# Patient Record
Sex: FEMALE | Race: WHITE | NOT HISPANIC OR LATINO | Employment: FULL TIME | URBAN - NONMETROPOLITAN AREA
[De-identification: names, ages, dates, MRNs, and addresses within clinical notes are randomized per-mention and may not be internally consistent; named-entity substitution may affect disease eponyms.]

---

## 2017-03-10 RX ORDER — AZELASTINE 1 MG/ML
1 SPRAY, METERED NASAL 2 TIMES DAILY
Qty: 1 EACH | Refills: 3 | Status: SHIPPED | OUTPATIENT
Start: 2017-03-10 | End: 2018-02-21 | Stop reason: SDUPTHER

## 2018-02-21 ENCOUNTER — OFFICE VISIT (OUTPATIENT)
Dept: INTERNAL MEDICINE | Facility: CLINIC | Age: 33
End: 2018-02-21

## 2018-02-21 VITALS
BODY MASS INDEX: 45.35 KG/M2 | TEMPERATURE: 98.1 F | HEIGHT: 60 IN | SYSTOLIC BLOOD PRESSURE: 120 MMHG | OXYGEN SATURATION: 97 % | WEIGHT: 231 LBS | DIASTOLIC BLOOD PRESSURE: 90 MMHG | HEART RATE: 86 BPM

## 2018-02-21 DIAGNOSIS — K52.9 GASTROENTERITIS: Primary | ICD-10-CM

## 2018-02-21 PROCEDURE — 99213 OFFICE O/P EST LOW 20 MIN: CPT | Performed by: PHYSICIAN ASSISTANT

## 2018-02-21 RX ORDER — PROMETHAZINE HYDROCHLORIDE 12.5 MG/1
12.5 TABLET ORAL EVERY 8 HOURS PRN
Qty: 12 TABLET | Refills: 0 | Status: SHIPPED | OUTPATIENT
Start: 2018-02-21 | End: 2021-06-26

## 2018-02-21 RX ORDER — AZELASTINE 1 MG/ML
1 SPRAY, METERED NASAL DAILY
Qty: 1 EACH | Refills: 3 | Status: SHIPPED | OUTPATIENT
Start: 2018-02-21 | End: 2018-05-23 | Stop reason: SDUPTHER

## 2018-02-21 NOTE — PROGRESS NOTES
"Subjective     Chief Complaint: diarrhea    History of Present Illness     Lorie Lara is a 32 y.o. female presenting with complaints of diarrhea, nausea, vomiting x 5-6 days.  Only 3 episodes of vomiting, but multiple watery bowel movements per day.  Diarrhea is watery, no blood or mucus visualized. Does not report any fevers. No recent travel. No recent antibiotic use.  She also notes increased acid reflux symptoms, she has been taking Tums which seemed to help.  She is staying well-hydrated.  Following BRAT diet.  Reports increased stress as she is in her last semester of grad school.    No recent labs, it has been 2-3 years.  She denies any prior GI issues.    The following portions of the patient's history were reviewed and updated as appropriate: current medications, allergies, PMH.    Review of Systems   Constitutional: Negative.    Respiratory: Negative.    Cardiovascular: Negative.    Gastrointestinal: Positive for diarrhea, nausea and vomiting. Negative for abdominal pain, blood in stool and constipation.   Genitourinary: Negative.    Neurological: Negative.        Objective     Vitals:    02/21/18 1130   BP: 120/90   Pulse: 86   Temp: 98.1 °F (36.7 °C)   SpO2: 97%   Weight: 105 kg (231 lb)   Height: 152.4 cm (60\")       Physical Exam   Constitutional: Appears well-developed and well-nourished.   Mouth/Throat: Oropharynx is clear and moist.   Neck: Normal range of motion. Neck supple.   Cardiovascular: Normal rate, regular rhythm and normal heart sounds.    Pulmonary/Chest: Effort normal and breath sounds normal.   Abdominal: Mildly tender to palpation in lower quadrants bilaterally.  Soft. Bowel sounds are normal x4. There is no CVA tenderness or epigastric tenderness.  Lowry sign negative.    Musculoskeletal: Normal range of motion.   Lymphadenopathy: No cervical adenopathy noted.   Neurological: Alert and oriented to person, place, and time.   Skin: Skin is warm and dry.   Psychiatric: Exhibits a " normal mood and affect.     Assessment/Plan     Diagnoses and all orders for this visit:    Gastroenteritis  -     promethazine (PHENERGAN) 12.5 MG tablet; Take 1 tablet by mouth Every 8 (Eight) Hours As Needed for Nausea or Vomiting.    Other orders  -     azelastine (ASTELIN) 0.1 % nasal spray; 1 spray into each nostril Daily.    Patient wishes for simple measures at this time.  Does not wish to try anything for GERD.  We discussed the importance of staying hydrated.  Promethazine given if needed.  If symptoms worsen or do not improve, RTC for labs, stool studies, further evaluation.  Patient agreeable to this plan.    Renetta Rivera PA-C  02/21/2018         Please note that portions of this note were completed with a voice recognition program. Efforts were made to edit dictation, but occasionally words are mistranscribed.

## 2018-05-23 RX ORDER — AZELASTINE 1 MG/ML
1 SPRAY, METERED NASAL DAILY
Qty: 1 EACH | Refills: 3 | Status: SHIPPED | OUTPATIENT
Start: 2018-05-23 | End: 2021-06-26

## 2020-06-29 ENCOUNTER — TRANSCRIBE ORDERS (OUTPATIENT)
Dept: LAB | Facility: HOSPITAL | Age: 35
End: 2020-06-29

## 2020-06-29 ENCOUNTER — LAB (OUTPATIENT)
Dept: LAB | Facility: HOSPITAL | Age: 35
End: 2020-06-29

## 2020-06-29 DIAGNOSIS — L08.0 PYODERMA: ICD-10-CM

## 2020-06-29 DIAGNOSIS — L08.0 PYODERMA: Primary | ICD-10-CM

## 2020-06-29 PROCEDURE — 87070 CULTURE OTHR SPECIMN AEROBIC: CPT

## 2020-06-29 PROCEDURE — 87205 SMEAR GRAM STAIN: CPT

## 2020-07-01 LAB
BACTERIA SPEC AEROBE CULT: NORMAL
GRAM STN SPEC: NORMAL
GRAM STN SPEC: NORMAL

## 2021-06-25 NOTE — PROGRESS NOTES
Date: 2021    Name: Lorie Lara  : 1985    Chief Complaint:   Chief Complaint   Patient presents with   • Establish Care     get lab order, discuss meds       HPI:  Lorie Lara is a 35 y.o. female presents to establish care.     A1C 5.8 in .  Had labs drawn at annual with GYN, Dr Martinez.  She has been eating fewer carbs since then.  Has lost 50 pounds.  Works as a  at [employer], sedentary job.  Has increased daily walking, increasing water intake, decreasing carb intake, abstaining from soda.  Takes Balance supplement - has folate, chromium, cecelia-inositol, setria L glutathione, diindolymethane, sodium R-alpha upoic acid daily.  Has plateaued with weight loss, has signed up for a [employer] challenge.  Is working with a dietitian, who helps her with macros and nutrient content in her food.    Hypothyroidism: TSH 1.17 in March.  Has been taking levothyroxine 100 mcg for 2 years, at the same dose. Denies abnormal fatigue, weight change, temperature intolerance, hair/skin/nail changes, bowel habit changes, palpitations, anxiety, sore throat, hoarseness.     Blood pressure is elevated today.  The only other time it has been elevated was while she was pregnant, immediately afterward. Denies chest pain, dyspnea, orthopnea, palpitations, lower extremity edema, confusion, headaches, weakness, visual disturbances.    Has pain in right heel.  Worse when standing in the morning, after sitting for long periods. 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  Pain has improved, not completely resolved.       History:  LMP: 22  Menarche: 13 years old  Sexual activity:   Last pap date: 3/2021   Abnormal pap? no  : 1  Para: 1, 2 year old daughter    Do you take any herbs or supplements that were not prescribed by a doctor? yes, Balance supplement described above.       Health Habits:  Dental Exam. up to date  Eye Exam. up  "to date, wears glasses  Exercise: 5 times/week.  Current exercise activities include: walking  Current diet: well balanced, low carb    History:    Past Medical History:   Diagnosis Date   • GERD (gastroesophageal reflux disease)    • Hypertension    • Upper respiratory infection        Past Surgical History:   Procedure Laterality Date   • INGUINAL HERNIA REPAIR     • TONSILLECTOMY         Family History   Problem Relation Age of Onset   • Hypertension Mother    • Thyroid disease Mother    • Arthritis Brother    • Cancer Other    • Melanoma Maternal Grandfather    • Obesity Paternal Grandmother        Social History     Socioeconomic History   • Marital status: Single     SO name: Wood Yanez   • Number of children: 1   • Highest education level: Master's in Public Health   Tobacco Use   • Smoking status: Former Smoker     Packs/day: 1.00     Years: 12.00     Pack years: 12.00     Types: Cigarettes     Quit date: 2013     Years since quittin.4   • Smokeless tobacco: Never Used   Substance and Sexual Activity   • Alcohol use: Yes     Comment: SOCIAL   • Drug use: No   • Sexual activity: Yes     Partners: Male     Birth control/protection: None       No Known Allergies      Current Outpatient Medications:   •  levothyroxine (SYNTHROID, LEVOTHROID) 100 MCG tablet, , Disp: , Rfl:   •  azelastine (ASTELIN) 0.1 % nasal spray, 2 sprays into the nostril(s) as directed by provider 2 (Two) Times a Day As Needed for Rhinitis. Use in each nostril as directed, Disp: 30 mL, Rfl: 11    VS:  Vitals:    21 1322   BP: 132/84   Pulse: 96   Temp: 97.3 °F (36.3 °C)   SpO2: 98%   Weight: 112 kg (247 lb)   Height: 152.4 cm (60\")     Body mass index is 48.24 kg/m².    PE:  Physical Exam  Constitutional:       Appearance: She is well-developed. She is morbidly obese. She is not ill-appearing.   HENT:      Head: Normocephalic.      Right Ear: Tympanic membrane, ear canal and external ear normal.      Left Ear: Tympanic " membrane, ear canal and external ear normal.      Nose: Nose normal.      Mouth/Throat:      Mouth: Mucous membranes are moist.      Pharynx: Oropharynx is clear. Uvula midline.   Eyes:      Extraocular Movements: Extraocular movements intact.      Conjunctiva/sclera: Conjunctivae normal.      Pupils: Pupils are equal, round, and reactive to light.   Neck:      Thyroid: No thyromegaly.      Vascular: No carotid bruit.   Cardiovascular:      Rate and Rhythm: Normal rate and regular rhythm.      Pulses: Normal pulses.      Heart sounds: Normal heart sounds.   Pulmonary:      Effort: Pulmonary effort is normal.      Breath sounds: Normal breath sounds.   Abdominal:      General: Bowel sounds are normal.      Palpations: Abdomen is soft.      Tenderness: There is no abdominal tenderness.   Musculoskeletal:         General: No tenderness or deformity.      Cervical back: Full passive range of motion without pain, normal range of motion and neck supple.      Right foot: Decreased range of motion (extension causes pain in heel).      Left foot: Normal.   Lymphadenopathy:      Cervical: No cervical adenopathy.   Skin:     General: Skin is warm.      Capillary Refill: Capillary refill takes less than 2 seconds.   Neurological:      Mental Status: She is alert and oriented to person, place, and time.      Sensory: No sensory deficit.      Coordination: Coordination normal.      Gait: Gait normal.      Comments: CN II-XII normal   Psychiatric:         Attention and Perception: Attention normal.         Mood and Affect: Mood and affect normal.         Speech: Speech normal.         Behavior: Behavior normal.         Thought Content: Thought content normal.       Office Visit on 06/26/2021   Component Date Value Ref Range Status   • Hemoglobin A1C 06/26/2021 5.3  % Final        Assessment/Plan:     Diagnoses and all orders for this visit:    1. Encounter to establish care (Primary)    2. Environmental and seasonal allergies  -      azelastine (ASTELIN) 0.1 % nasal spray; 2 sprays into the nostril(s) as directed by provider 2 (Two) Times a Day As Needed for Rhinitis. Use in each nostril as directed  Dispense: 30 mL; Refill: 11        - Avoid known allergy triggers when possible, wash hands and face after exposure.  Consider changing clothes.        - HEPA filter in vacuum and HVAC        - Change/launder bed linens at least once a week        - Vacuum carpeted areas in the home, 2-3 times a week    3. Screening for disorder of blood and blood-forming organs  -     CBC (No Diff)    4. Screening for endocrine, metabolic and immunity disorder  -     Comprehensive Metabolic Panel    5. Vitamin D deficiency  -     Vitamin D 25 Hydroxy    6. Encounter for hepatitis C screening test for low risk patient  -     Hepatitis C Antibody    7. Hyperglycemia  -     POC Glycosylated Hemoglobin (Hb A1C)  - Continue increasing physical activity, eating healthy lowcarb diet    8. Elevated blood pressure reading        - Follow heart healthy diet.  Advised to reduce daily sodium intake to < 1500 mg per day.  Avoid processed & fast foods.        - Exercise as tolerated, with a goal of 30 minutes of moderate exercise most days.       9. Encounter for annual physical examination excluding gynecological examination in a patient older than 17 years    10. Plantar fasciitis of right foot         - Continue stretches of foot.         - May consider night splint HS         - Weight loss should reduce pain      11. Morbid obesity with body mass index (BMI) of 45.0 to 49.9 in adult (CMS/[facility])         - Patient's (Body mass index is 48.24 kg/m².) indicates that they are morbidly obese (BMI > 40 or > 35 with obesity - related health condition) with obesity-related health conditions that include GERD . Obesity is improving with lifestyle modifications. BMI is is above average; BMI management plan is completed. We discussed low calorie, low carb based diet program, portion  control and increasing exercise.         Return in about 4 weeks (around 7/24/2021) for Next scheduled follow up.

## 2021-06-26 ENCOUNTER — OFFICE VISIT (OUTPATIENT)
Dept: INTERNAL MEDICINE | Facility: CLINIC | Age: 36
End: 2021-06-26

## 2021-06-26 VITALS
TEMPERATURE: 97.3 F | DIASTOLIC BLOOD PRESSURE: 84 MMHG | WEIGHT: 247 LBS | SYSTOLIC BLOOD PRESSURE: 132 MMHG | HEART RATE: 96 BPM | HEIGHT: 60 IN | OXYGEN SATURATION: 98 % | BODY MASS INDEX: 48.49 KG/M2

## 2021-06-26 DIAGNOSIS — Z11.59 ENCOUNTER FOR HEPATITIS C SCREENING TEST FOR LOW RISK PATIENT: ICD-10-CM

## 2021-06-26 DIAGNOSIS — Z13.228 SCREENING FOR ENDOCRINE, METABOLIC AND IMMUNITY DISORDER: ICD-10-CM

## 2021-06-26 DIAGNOSIS — E66.01 MORBID OBESITY WITH BODY MASS INDEX (BMI) OF 45.0 TO 49.9 IN ADULT (HCC): ICD-10-CM

## 2021-06-26 DIAGNOSIS — Z13.0 SCREENING FOR ENDOCRINE, METABOLIC AND IMMUNITY DISORDER: ICD-10-CM

## 2021-06-26 DIAGNOSIS — Z13.29 SCREENING FOR ENDOCRINE, METABOLIC AND IMMUNITY DISORDER: ICD-10-CM

## 2021-06-26 DIAGNOSIS — J30.89 ENVIRONMENTAL AND SEASONAL ALLERGIES: ICD-10-CM

## 2021-06-26 DIAGNOSIS — E55.9 VITAMIN D DEFICIENCY: ICD-10-CM

## 2021-06-26 DIAGNOSIS — R03.0 ELEVATED BLOOD PRESSURE READING: ICD-10-CM

## 2021-06-26 DIAGNOSIS — R73.9 HYPERGLYCEMIA: ICD-10-CM

## 2021-06-26 DIAGNOSIS — Z00.00 ENCOUNTER FOR ANNUAL PHYSICAL EXAMINATION EXCLUDING GYNECOLOGICAL EXAMINATION IN A PATIENT OLDER THAN 17 YEARS: ICD-10-CM

## 2021-06-26 DIAGNOSIS — Z76.89 ENCOUNTER TO ESTABLISH CARE: Primary | ICD-10-CM

## 2021-06-26 DIAGNOSIS — M72.2 PLANTAR FASCIITIS OF RIGHT FOOT: ICD-10-CM

## 2021-06-26 DIAGNOSIS — Z13.0 SCREENING FOR DISORDER OF BLOOD AND BLOOD-FORMING ORGANS: ICD-10-CM

## 2021-06-26 PROBLEM — J06.9 UPPER RESPIRATORY TRACT INFECTION: Status: ACTIVE | Noted: 2021-06-26

## 2021-06-26 PROBLEM — E07.9 DISORDER OF THYROID: Status: ACTIVE | Noted: 2021-06-26

## 2021-06-26 PROBLEM — B37.2 CANDIDIASIS OF SKIN: Status: ACTIVE | Noted: 2021-06-26

## 2021-06-26 LAB — HBA1C MFR BLD: 5.3 %

## 2021-06-26 PROCEDURE — 99395 PREV VISIT EST AGE 18-39: CPT | Performed by: NURSE PRACTITIONER

## 2021-06-26 RX ORDER — LEVOTHYROXINE SODIUM 0.1 MG/1
TABLET ORAL
COMMUNITY
Start: 2021-06-15 | End: 2021-09-20 | Stop reason: SDUPTHER

## 2021-06-26 RX ORDER — AZELASTINE 1 MG/ML
2 SPRAY, METERED NASAL 2 TIMES DAILY PRN
Qty: 30 ML | Refills: 11 | Status: SHIPPED | OUTPATIENT
Start: 2021-06-26 | End: 2021-09-20 | Stop reason: SDUPTHER

## 2021-09-20 DIAGNOSIS — J30.89 ENVIRONMENTAL AND SEASONAL ALLERGIES: ICD-10-CM

## 2021-09-20 RX ORDER — LEVOTHYROXINE SODIUM 0.1 MG/1
100 TABLET ORAL DAILY
Qty: 30 TABLET | Refills: 0 | Status: SHIPPED | OUTPATIENT
Start: 2021-09-20 | End: 2021-10-15 | Stop reason: SDUPTHER

## 2021-09-20 RX ORDER — AZELASTINE 1 MG/ML
2 SPRAY, METERED NASAL 2 TIMES DAILY PRN
Qty: 30 ML | Refills: 11 | Status: ON HOLD | OUTPATIENT
Start: 2021-09-20 | End: 2022-06-17

## 2021-09-20 NOTE — TELEPHONE ENCOUNTER
Caller: Lorie Lara    Relationship: Self    Best call back number: [phone]    Medication needed:   Requested Prescriptions     Pending Prescriptions Disp Refills   • azelastine (ASTELIN) 0.1 % nasal spray 30 mL 11     Si sprays into the nostril(s) as directed by provider 2 (Two) Times a Day As Needed for Rhinitis. Use in each nostril as directed   • levothyroxine (SYNTHROID, LEVOTHROID) 100 MCG tablet         When do you need the refill by:ASAP    What additional details did the patient provide when requesting the medication: COMPLETELY OUT    Does the patient have less than a 3 day supply:  [x] Yes  [] No    What is the patient's preferred pharmacy: [facility] [address] [place] [address] [facility] [phone] [facility] [phone] FX

## 2021-09-22 LAB
25(OH)D3+25(OH)D2 SERPL-MCNC: 55.9 NG/ML (ref 30–100)
ALBUMIN SERPL-MCNC: 4.6 G/DL (ref 3.8–4.8)
ALBUMIN/GLOB SERPL: 2 {RATIO} (ref 1.2–2.2)
ALP SERPL-CCNC: 78 IU/L (ref 44–121)
ALT SERPL-CCNC: 14 IU/L (ref 0–32)
AST SERPL-CCNC: 15 IU/L (ref 0–40)
BILIRUB SERPL-MCNC: <0.2 MG/DL (ref 0–1.2)
BUN SERPL-MCNC: 15 MG/DL (ref 6–20)
BUN/CREAT SERPL: 22 (ref 9–23)
CALCIUM SERPL-MCNC: 9.5 MG/DL (ref 8.7–10.2)
CHLORIDE SERPL-SCNC: 107 MMOL/L (ref 96–106)
CO2 SERPL-SCNC: 21 MMOL/L (ref 20–29)
CREAT SERPL-MCNC: 0.69 MG/DL (ref 0.57–1)
ERYTHROCYTE [DISTWIDTH] IN BLOOD BY AUTOMATED COUNT: 12.6 % (ref 11.7–15.4)
GLOBULIN SER CALC-MCNC: 2.3 G/DL (ref 1.5–4.5)
GLUCOSE SERPL-MCNC: 102 MG/DL (ref 65–99)
HCT VFR BLD AUTO: 40.7 % (ref 34–46.6)
HCV AB S/CO SERPL IA: <0.1 S/CO RATIO (ref 0–0.9)
HGB BLD-MCNC: 13.7 G/DL (ref 11.1–15.9)
MCH RBC QN AUTO: 30.1 PG (ref 26.6–33)
MCHC RBC AUTO-ENTMCNC: 33.7 G/DL (ref 31.5–35.7)
MCV RBC AUTO: 90 FL (ref 79–97)
PLATELET # BLD AUTO: 235 X10E3/UL (ref 150–450)
POTASSIUM SERPL-SCNC: 4 MMOL/L (ref 3.5–5.2)
PROT SERPL-MCNC: 6.9 G/DL (ref 6–8.5)
RBC # BLD AUTO: 4.55 X10E6/UL (ref 3.77–5.28)
SODIUM SERPL-SCNC: 140 MMOL/L (ref 134–144)
WBC # BLD AUTO: 7.5 X10E3/UL (ref 3.4–10.8)

## 2021-09-27 ENCOUNTER — TRANSCRIBE ORDERS (OUTPATIENT)
Dept: LAB | Facility: HOSPITAL | Age: 36
End: 2021-09-27

## 2021-09-27 ENCOUNTER — LAB (OUTPATIENT)
Dept: LAB | Facility: HOSPITAL | Age: 36
End: 2021-09-27

## 2021-09-27 DIAGNOSIS — Z20.822 COVID-19 RULED OUT: Primary | ICD-10-CM

## 2021-09-27 DIAGNOSIS — Z20.822 COVID-19 RULED OUT: ICD-10-CM

## 2021-09-27 LAB — SARS-COV-2 RNA NOSE QL NAA+PROBE: NOT DETECTED

## 2021-09-27 PROCEDURE — U0004 COV-19 TEST NON-CDC HGH THRU: HCPCS

## 2021-09-27 PROCEDURE — C9803 HOPD COVID-19 SPEC COLLECT: HCPCS

## 2021-10-15 RX ORDER — LEVOTHYROXINE SODIUM 0.1 MG/1
100 TABLET ORAL DAILY
Qty: 30 TABLET | Refills: 0 | Status: SHIPPED | OUTPATIENT
Start: 2021-10-15 | End: 2021-11-14 | Stop reason: SDUPTHER

## 2021-11-14 DIAGNOSIS — E07.9 THYROID DISORDER: Primary | ICD-10-CM

## 2021-11-15 RX ORDER — LEVOTHYROXINE SODIUM 0.1 MG/1
100 TABLET ORAL DAILY
Qty: 30 TABLET | Refills: 0 | Status: SHIPPED | OUTPATIENT
Start: 2021-11-15 | End: 2021-12-15 | Stop reason: SDUPTHER

## 2021-11-15 NOTE — TELEPHONE ENCOUNTER
Patient is requesting a 90 day supply, I do not see that labs was drawn. Per patient she states she had them drawn in Sept

## 2021-11-15 NOTE — TELEPHONE ENCOUNTER
Refill for one month sent.  Order placed for TSH, to be drawn before another refill sent.      If she's had labs drawn elsewhere, can send copies.

## 2021-11-29 ENCOUNTER — TELEMEDICINE (OUTPATIENT)
Dept: FAMILY MEDICINE CLINIC | Facility: TELEHEALTH | Age: 36
End: 2021-11-29

## 2021-11-29 DIAGNOSIS — J06.9 VIRAL UPPER RESPIRATORY TRACT INFECTION: Primary | ICD-10-CM

## 2021-11-29 PROCEDURE — 99213 OFFICE O/P EST LOW 20 MIN: CPT | Performed by: NURSE PRACTITIONER

## 2021-11-29 RX ORDER — ONDANSETRON 4 MG/1
4 TABLET, ORALLY DISINTEGRATING ORAL EVERY 8 HOURS PRN
Qty: 10 TABLET | Refills: 0 | Status: SHIPPED | OUTPATIENT
Start: 2021-11-29 | End: 2022-02-07

## 2021-11-29 RX ORDER — PSEUDOEPHEDRINE HCL 120 MG/1
120 TABLET, FILM COATED, EXTENDED RELEASE ORAL EVERY 12 HOURS
Qty: 30 TABLET | Refills: 0 | Status: SHIPPED | OUTPATIENT
Start: 2021-11-29 | End: 2021-12-14

## 2021-11-29 NOTE — PATIENT INSTRUCTIONS
You may feel you have not been exposed to COVID-19, but you are having mild symptoms. Many of the symptoms you get around allergy, cold and flu season may be mistaken for allergy or sinus symptoms, but for some These are the only symptoms they have with their COVID-19 illness.   Testing will be the only way to know.   Symptoms may worsen over the next several days.   I have included a COVID-19 test. Go to your nearest [facility] for testing. Tell them you have already been seen virtually and only need testing   We will notify you of your COVID-19 results by phone. You will receive a call from an unknown or blocked number. You can also get your results on your [employer] [facility].  You will need to remain quarantined for 10 days from onset of symptoms and only to discontinue if symptoms have improved and no fever for 24 hours without the use of fever reducing medications such as Tylenol (acetaminophen), Advil (ibuprfen), or Aleve (naproxen).  Continue to wear a mask for 14 days or until symptoms resolve. If symptoms worsen, go to Urgent Care or Emergency Department for care.   Symptoms of Coronavirus are treated just as you would for any viral illness. Take Tylenol and/or Ibuprofen for pain and/or fever, you may find it better to alternate these every 4 hours, so that you are only taking each every 8 hours. Stay hydrated, rest and you may treat cough with over-the-counter cough syrup such as Robitussin.  If your symptoms do not improve, symptoms change or do not fully resolve, seek further evaluation with your primary care provider or Urgent Care. If you develop severe symptoms, such as chest pain, high fever, difficulty breathing, difficulty urinating or have any symptoms that interfere with your ability to function go to the nearest Emergency Department immediately.       3 Key Steps to Take While Waiting for Your COVID-19 Test Result  To help stop the spread of COVID-19, take these 3 key steps NOW while  waiting for your test results:  1. Stay home and monitor your health.  Stay home and monitor your health to help protect your friends, family, and others from possibly getting COVID-19 from you.  Stay home and away from others:  · If possible, stay away from others, especially people who are at higher risk for getting very sick from COVID-19, such as older adults and people with other medical conditions.  · If you have been in contact with someone with COVID-19, stay home and away from others for 14 days after your last contact with that person. Follow the recommendations of your local public health department if you need to quarantine.  · If you have a fever, cough or other symptoms of COVID-19, stay home and away from others (except to get medical care).  Monitor your health:  · Watch for fever, cough, shortness of breath, or other symptoms of COVID-19. Remember, symptoms may appear 2-14 days after exposure to COVID-19 and can include:  ? Fever or chills  ? Cough  ? Shortness of breath or difficulty breathing  ? Tiredness  ? Muscle or body aches  ? Headache  ? New loss of taste or smell  ? Sore throat  ? Congestion or runny nose  ? Nausea or vomiting  ? Diarrhea  2. Think about the people you have recently been around.  If you are diagnosed with COVID-19, a public health worker may call you to check on your health, discuss who you have been around, and ask where you spent time while you may have been able to spread COVID-19 to others. While you wait for your COVID-19 test result, think about everyone you have been around recently. This will be important information to give health workers if your test is positive.   Complete the information on the back of this page to help you remember everyone you have been around.   3. Answer the phone call from the health department.  If a public health worker calls you, answer the call to help slow the spread of COVID-19 in your community.  · Discussions with health department  staff are confidential. This means that your personal and medical information will be kept private and only shared with those who may need to know, like your health care provider.  · Your name will not be shared with those you came in contact with. The health department will only notify people you were in close contact with (within 6 feet for more than 15 minutes) that they might have been exposed to COVID-19.  Think about the people you have recently been around  If you test positive and are diagnosed with COVID-19, someone from the health department may call to check-in on your health, discuss who you have been around, and ask where you spent time while you may have been able to spread COVID-19 to others. This form can help you think about people you have recently been around so you will be ready if a public health worker calls you.  Things to think about. Have you:  · Gone to work or school?  · Gotten together with others (eaten out at a restaurant, gone out for drinks, exercised with others or gone to a gym, had friends or family over to your house, volunteered, gone to a party, pool, or park)?  · Gone to a store in person (e.g., grocery store, mall)?  · Gone to in-person appointments (e.g., salon, goff, doctor's or dentist's office)?  · Ridden in a car with others (e.g., rideshare) or taken public transportation?  · Been inside a Confucianism, Methodist, Anabaptist or other places of Anabaptism?  Who lives with you?  · ______________________________________________________________________  · ______________________________________________________________________  · ______________________________________________________________________  · ______________________________________________________________________  Who have you been around (less than 6 feet for a total of 15 minutes or more) in the last 10 days? (You may have more people to list than the space provided. If so, write on the front of this sheet or a separate piece  of paper.)  Name ______________________________________________  · Phone number ____________________________________  · Date you last saw them _____________________________  · Where you last saw them ________________________________________________  Name ______________________________________________  · Phone number ____________________________________  · Date you last saw them _____________________________  · Where you last saw them ________________________________________________  Name ______________________________________________  · Phone number ____________________________________  · Date you last saw them _____________________________  · Where you last saw them ________________________________________________  Name ______________________________________________  · Phone number ____________________________________  · Date you last saw them _____________________________  · Where you last saw them ________________________________________________  Name ______________________________________________  · Phone number ____________________________________  · Date you last saw them _____________________________  · Where you last saw them ________________________________________________  What have you done in the last 10 days with other people?  Activity _____________________________________________  · Location _________________________________________  · Date ____________________________________________  Activity _____________________________________________  · Location _________________________________________  · Date ____________________________________________  Activity _____________________________________________  · Location _________________________________________  · Date ____________________________________________  Activity _____________________________________________  · Location _________________________________________  · Date ____________________________________________  Activity  _____________________________________________  · Location _________________________________________  · Date ____________________________________________  Centers for Disease Control and Prevention  cdc.gov/coronavirus  07/09/2021  This information is not intended to replace advice given to you by your health care provider. Make sure you discuss any questions you have with your health care provider.  Document Revised: 07/14/2021 Document Reviewed: 07/14/2021  Elsevier Patient Education © 2021 [employer] Inc[employer]

## 2021-11-29 NOTE — PROGRESS NOTES
You have chosen to receive care through a telehealth visit.  Do you consent to use a video/audio connection for your medical care today? Yes     CHIEF COMPLAINT  Chief Complaint   Patient presents with   • Sinusitis         HPI  Lorie Lara is a 36 y.o. female  presents with complaint of sinusitis symptoms for several days. She has been taking her regular allergy medication.     Review of Systems   Constitutional: Positive for fatigue. Negative for chills, diaphoresis and fever.   HENT: Positive for congestion, postnasal drip, rhinorrhea, sinus pressure, sneezing and sore throat. Negative for sinus pain.         Loss of taste    Respiratory: Positive for cough. Negative for shortness of breath and wheezing.    Cardiovascular: Negative for chest pain.   Gastrointestinal: Positive for nausea. Negative for diarrhea and vomiting.   Skin: Negative for rash.   Neurological: Positive for light-headedness (x 1).   Hematological: Negative for adenopathy.       Past Medical History:   Diagnosis Date   • GERD (gastroesophageal reflux disease)    • Hypertension    • Upper respiratory infection        Family History   Problem Relation Age of Onset   • Hypertension Mother    • Thyroid disease Mother    • Arthritis Brother    • Cancer Other    • Melanoma Maternal Grandfather    • Obesity Paternal Grandmother        Social History     Socioeconomic History   • Marital status: Single   Tobacco Use   • Smoking status: Former Smoker     Packs/day: 1.00     Years: 12.00     Pack years: 12.00     Types: Cigarettes     Quit date: 2013     Years since quittin.9   • Smokeless tobacco: Never Used   Substance and Sexual Activity   • Alcohol use: Yes     Comment: SOCIAL   • Drug use: No   • Sexual activity: Yes     Partners: Male     Birth control/protection: None         There were no vitals taken for this visit.    PHYSICAL EXAM  Virtual Visit Physical Exam    Results for orders placed or performed in visit on 21    COVID-19 PCR, [facility], NP SWAB IN [facility] VIRAL TRANSPORT MEDIA/ORAL SWISH 24-30 HR TAT - Swab, Nasopharynx    Specimen: Nasopharynx; Swab   Result Value Ref Range    SARS-CoV-2 JADA Not Detected Not Detected       Diagnoses and all orders for this visit:    1. Viral upper respiratory tract infection (Primary)    Other orders  -     pseudoephedrine (Sudafed 12 Hour) 120 MG 12 hr tablet; Take 1 tablet by mouth Every 12 (Twelve) Hours for 15 days.  Dispense: 30 tablet; Refill: 0  -     ondansetron ODT (Zofran ODT) 4 MG disintegrating tablet; Place 1 tablet on the tongue Every 8 (Eight) Hours As Needed for Nausea or Vomiting.  Dispense: 10 tablet; Refill: 0      COVID-19 (PFIZER) 1/20/2021, 12/30/2020   Recommend COVID-19 testing. She works at [employer] and will have testing done there tomorrow at their drive through.       FOLLOW-UP  As discussed during visit with PCP/[facility] if no improvement or Urgent Care/Emergency Department if worsening of symptoms    Patient verbalizes understanding of medication dosage, comfort measures, instructions for treatment and follow-up.    Belgica Medeiros, APRN  11/29/2021  16:40 EST    This visit was performed via Telehealth.  This patient has been instructed to follow-up with their primary care provider if their symptoms worsen or the treatment provided does not resolve their illness.

## 2021-11-30 ENCOUNTER — CLINICAL SUPPORT (OUTPATIENT)
Dept: INTERNAL MEDICINE | Facility: CLINIC | Age: 36
End: 2021-11-30

## 2021-11-30 ENCOUNTER — TELEPHONE (OUTPATIENT)
Dept: INTERNAL MEDICINE | Facility: CLINIC | Age: 36
End: 2021-11-30

## 2021-11-30 DIAGNOSIS — R53.83 FATIGUE, UNSPECIFIED TYPE: ICD-10-CM

## 2021-11-30 DIAGNOSIS — R09.81 NASAL CONGESTION: ICD-10-CM

## 2021-11-30 DIAGNOSIS — R05.9 COUGH: ICD-10-CM

## 2021-11-30 DIAGNOSIS — R53.83 FATIGUE, UNSPECIFIED TYPE: Primary | ICD-10-CM

## 2021-11-30 PROCEDURE — U0004 COV-19 TEST NON-CDC HGH THRU: HCPCS | Performed by: NURSE PRACTITIONER

## 2021-12-01 LAB — SARS-COV-2 RNA NOSE QL NAA+PROBE: NOT DETECTED

## 2021-12-02 ENCOUNTER — TELEMEDICINE (OUTPATIENT)
Dept: FAMILY MEDICINE CLINIC | Facility: TELEHEALTH | Age: 36
End: 2021-12-02

## 2021-12-02 DIAGNOSIS — J01.00 ACUTE MAXILLARY SINUSITIS, RECURRENCE NOT SPECIFIED: Primary | ICD-10-CM

## 2021-12-02 PROCEDURE — 99213 OFFICE O/P EST LOW 20 MIN: CPT | Performed by: NURSE PRACTITIONER

## 2021-12-02 RX ORDER — BROMPHENIRAMINE MALEATE, PSEUDOEPHEDRINE HYDROCHLORIDE, AND DEXTROMETHORPHAN HYDROBROMIDE 2; 30; 10 MG/5ML; MG/5ML; MG/5ML
10 SYRUP ORAL 4 TIMES DAILY PRN
Qty: 200 ML | Refills: 0 | Status: SHIPPED | OUTPATIENT
Start: 2021-12-02 | End: 2022-01-10

## 2021-12-02 RX ORDER — AMOXICILLIN AND CLAVULANATE POTASSIUM 875; 125 MG/1; MG/1
1 TABLET, FILM COATED ORAL 2 TIMES DAILY
Qty: 20 TABLET | Refills: 0 | Status: SHIPPED | OUTPATIENT
Start: 2021-12-02 | End: 2021-12-12

## 2021-12-02 NOTE — PATIENT INSTRUCTIONS
Sinusitis, Adult  Sinusitis is inflammation of your sinuses. Sinuses are hollow spaces in the bones around your face. Your sinuses are located:  · Around your eyes.  · In the middle of your forehead.  · Behind your nose.  · In your cheekbones.  Mucus normally drains out of your sinuses. When your nasal tissues become inflamed or swollen, mucus can become trapped or blocked. This allows bacteria, viruses, and fungi to grow, which leads to infection. Most infections of the sinuses are caused by a virus.  Sinusitis can develop quickly. It can last for up to 4 weeks (acute) or for more than 12 weeks (chronic). Sinusitis often develops after a cold.  What are the causes?  This condition is caused by anything that creates swelling in the sinuses or stops mucus from draining. This includes:  · Allergies.  · Asthma.  · Infection from bacteria or viruses.  · Deformities or blockages in your nose or sinuses.  · Abnormal growths in the nose (nasal polyps).  · Pollutants, such as chemicals or irritants in the air.  · Infection from fungi (rare).  What increases the risk?  You are more likely to develop this condition if you:  · Have a weak body defense system (immune system).  · Do a lot of swimming or diving.  · Overuse nasal sprays.  · Smoke.  What are the signs or symptoms?  The main symptoms of this condition are pain and a feeling of pressure around the affected sinuses. Other symptoms include:  · Stuffy nose or congestion.  · Thick drainage from your nose.  · Swelling and warmth over the affected sinuses.  · Headache.  · Upper toothache.  · A cough that may get worse at night.  · Extra mucus that collects in the throat or the back of the nose (postnasal drip).  · Decreased sense of smell and taste.  · Fatigue.  · A fever.  · Sore throat.  · Bad breath.  How is this diagnosed?  This condition is diagnosed based on:  · Your symptoms.  · Your medical history.  · A physical exam.  · Tests to find out if your condition is  acute or chronic. This may include:  ? Checking your nose for nasal polyps.  ? Viewing your sinuses using a device that has a light (endoscope).  ? Testing for allergies or bacteria.  ? Imaging tests, such as an MRI or CT scan.  In rare cases, a bone biopsy may be done to rule out more serious types of fungal sinus disease.  How is this treated?  Treatment for sinusitis depends on the cause and whether your condition is chronic or acute.  · If caused by a virus, your symptoms should go away on their own within 10 days. You may be given medicines to relieve symptoms. They include:  ? Medicines that shrink swollen nasal passages (topical intranasal decongestants).  ? Medicines that treat allergies (antihistamines).  ? A spray that eases inflammation of the nostrils (topical intranasal corticosteroids).  ? Rinses that help get rid of thick mucus in your nose (nasal saline washes).  · If caused by bacteria, your health care provider may recommend waiting to see if your symptoms improve. Most bacterial infections will get better without antibiotic medicine. You may be given antibiotics if you have:  ? A severe infection.  ? A weak immune system.  · If caused by narrow nasal passages or nasal polyps, you may need to have surgery.  Follow these instructions at home:  Medicines  · Take, use, or apply over-the-counter and prescription medicines only as told by your health care provider. These may include nasal sprays.  · If you were prescribed an antibiotic medicine, take it as told by your health care provider. Do not stop taking the antibiotic even if you start to feel better.  Hydrate and humidify    · Drink enough fluid to keep your urine pale yellow. Staying hydrated will help to thin your mucus.  · Use a cool mist humidifier to keep the humidity level in your home above 50%.  · Inhale steam for 10-15 minutes, 3-4 times a day, or as told by your health care provider. You can do this in the bathroom while a hot shower is  running.  · Limit your exposure to cool or dry air.    Rest  · Rest as much as possible.  · Sleep with your head raised (elevated).  · Make sure you get enough sleep each night.  General instructions    · Apply a warm, moist washcloth to your face 3-4 times a day or as told by your health care provider. This will help with discomfort.  · Wash your hands often with soap and water to reduce your exposure to germs. If soap and water are not available, use hand .  · Do not smoke. Avoid being around people who are smoking (secondhand smoke).  · Keep all follow-up visits as told by your health care provider. This is important.    Contact a health care provider if:  · You have a fever.  · Your symptoms get worse.  · Your symptoms do not improve within 10 days.  Get help right away if:  · You have a severe headache.  · You have persistent vomiting.  · You have severe pain or swelling around your face or eyes.  · You have vision problems.  · You develop confusion.  · Your neck is stiff.  · You have trouble breathing.  Summary  · Sinusitis is soreness and inflammation of your sinuses. Sinuses are hollow spaces in the bones around your face.  · This condition is caused by nasal tissues that become inflamed or swollen. The swelling traps or blocks the flow of mucus. This allows bacteria, viruses, and fungi to grow, which leads to infection.  · If you were prescribed an antibiotic medicine, take it as told by your health care provider. Do not stop taking the antibiotic even if you start to feel better.  · Keep all follow-up visits as told by your health care provider. This is important.  This information is not intended to replace advice given to you by your health care provider. Make sure you discuss any questions you have with your health care provider.  Document Revised: 05/20/2019 Document Reviewed: 05/20/2019  [employer] Patient Education © 2021 [employer]

## 2021-12-02 NOTE — PROGRESS NOTES
You have chosen to receive care through a telehealth visit.  Do you consent to use a video/audio connection for your medical care today? Yes     CHIEF COMPLAINT  No chief complaint on file.        HPI  Lorie Lara is a 36 y.o. female  presents with complaint of nasal congestion with green discharge, facial pressure, ear pain, sore throat, PND, cough with green and occasional brown sputum .  Denies fever, shortness of breath or wheezing.     COVID-19 result is negative    Review of Systems   Constitutional: Negative for fatigue and fever.   HENT: Positive for congestion, ear pain, postnasal drip, sinus pressure, sinus pain and sore throat.    Respiratory: Positive for cough. Negative for chest tightness, shortness of breath and wheezing.    All other systems reviewed and are negative.      Past Medical History:   Diagnosis Date   • GERD (gastroesophageal reflux disease)    • Hypertension    • Upper respiratory infection        Family History   Problem Relation Age of Onset   • Hypertension Mother    • Thyroid disease Mother    • Arthritis Brother    • Cancer Other    • Melanoma Maternal Grandfather    • Obesity Paternal Grandmother        Social History     Socioeconomic History   • Marital status: Single   Tobacco Use   • Smoking status: Former Smoker     Packs/day: 1.00     Years: 12.00     Pack years: 12.00     Types: Cigarettes     Quit date: 2013     Years since quittin.9   • Smokeless tobacco: Never Used   Substance and Sexual Activity   • Alcohol use: Yes     Comment: SOCIAL   • Drug use: No   • Sexual activity: Yes     Partners: Male     Birth control/protection: None         There were no vitals taken for this visit.    PHYSICAL EXAM  Physical Exam   Constitutional: She is oriented to person, place, and time. She appears well-developed and well-nourished. She does not have a sickly appearance. She does not appear ill.   HENT:   Head: Normocephalic and atraumatic.   Bilateral maxillary sinus  tenderness   Pulmonary/Chest: Effort normal.  No respiratory distress.  Neurological: She is alert and oriented to person, place, and time.       Results for orders placed or performed in visit on 11/30/21   COVID-19 PCR, [facility], NP SWAB IN [facility] VIRAL TRANSPORT MEDIA/ORAL SWISH 24-30 HR TAT - Swab, Nasopharynx    Specimen: Nasopharynx; Swab   Result Value Ref Range    SARS-CoV-2 JADA Not Detected Not Detected       Diagnoses and all orders for this visit:    1. Acute maxillary sinusitis, recurrence not specified (Primary)  -     amoxicillin-clavulanate (AUGMENTIN) 875-125 MG per tablet; Take 1 tablet by mouth 2 (Two) Times a Day for 10 days.  Dispense: 20 tablet; Refill: 0  -     brompheniramine-pseudoephedrine-DM 30-2-10 MG/5ML syrup; Take 10 mL by mouth 4 (Four) Times a Day As Needed for Congestion, Cough or Allergies.  Dispense: 200 mL; Refill: 0    --take medications as prescribed  --increase fluids; tylenol or ibuprofen for pain  --if no improvement in 5-7 days, will need follow-up for further evaluation      FOLLOW-UP  As discussed during visit with PCP/[facility] if no improvement or Urgent Care/Emergency Department if worsening of symptoms    Patient verbalizes understanding of medication dosage, comfort measures, instructions for treatment and follow-up.    TITO Bauer  12/02/2021  07:53 EST    This visit was performed via Telehealth.  This patient has been instructed to follow-up with their primary care provider if their symptoms worsen or the treatment provided does not resolve their illness.

## 2021-12-13 ENCOUNTER — PATIENT MESSAGE (OUTPATIENT)
Dept: INTERNAL MEDICINE | Facility: CLINIC | Age: 36
End: 2021-12-13

## 2021-12-13 DIAGNOSIS — Z32.01 POSITIVE URINE PREGNANCY TEST: ICD-10-CM

## 2021-12-13 DIAGNOSIS — E07.9 THYROID DISORDER: Primary | ICD-10-CM

## 2021-12-14 NOTE — TELEPHONE ENCOUNTER
Reji Og MA 12/14/2021 7:44 AM EST    Are you ok with ordering this with TSH?  ----- Message -----  From: Lorie Lara  Sent: 12/14/2021 6:07 AM EST  To: [facility]  Subject: Additional Lab     Took another test this morning and it was positive as well. I’ll schedule an appointment.

## 2021-12-15 LAB
HCG INTACT+B SERPL-ACNC: 125 MIU/ML
TSH SERPL DL<=0.005 MIU/L-ACNC: 3.31 UIU/ML (ref 0.45–4.5)

## 2021-12-15 RX ORDER — LEVOTHYROXINE SODIUM 0.1 MG/1
100 TABLET ORAL DAILY
Qty: 90 TABLET | Refills: 2 | Status: SHIPPED | OUTPATIENT
Start: 2021-12-15 | End: 2022-08-29 | Stop reason: SDUPTHER

## 2022-01-10 ENCOUNTER — INITIAL PRENATAL (OUTPATIENT)
Dept: OBSTETRICS AND GYNECOLOGY | Facility: CLINIC | Age: 37
End: 2022-01-10

## 2022-01-10 VITALS — DIASTOLIC BLOOD PRESSURE: 74 MMHG | WEIGHT: 245 LBS | SYSTOLIC BLOOD PRESSURE: 124 MMHG | BODY MASS INDEX: 47.85 KG/M2

## 2022-01-10 DIAGNOSIS — E07.9 THYROID DISORDER: ICD-10-CM

## 2022-01-10 DIAGNOSIS — Z86.32 HISTORY OF GESTATIONAL DIABETES: ICD-10-CM

## 2022-01-10 DIAGNOSIS — Z34.81 PRENATAL CARE, SUBSEQUENT PREGNANCY, FIRST TRIMESTER: Primary | ICD-10-CM

## 2022-01-10 DIAGNOSIS — O09.521 MULTIGRAVIDA OF ADVANCED MATERNAL AGE IN FIRST TRIMESTER: ICD-10-CM

## 2022-01-10 DIAGNOSIS — Z87.59 HISTORY OF GESTATIONAL HYPERTENSION: ICD-10-CM

## 2022-01-10 PROCEDURE — 0501F PRENATAL FLOW SHEET: CPT | Performed by: OBSTETRICS & GYNECOLOGY

## 2022-01-10 PROCEDURE — 36415 COLL VENOUS BLD VENIPUNCTURE: CPT | Performed by: OBSTETRICS & GYNECOLOGY

## 2022-01-10 RX ORDER — PROMETHAZINE HYDROCHLORIDE 25 MG/1
25 TABLET ORAL EVERY 6 HOURS PRN
Qty: 30 TABLET | Refills: 0 | Status: ON HOLD | OUTPATIENT
Start: 2022-01-10 | End: 2022-06-17

## 2022-01-10 RX ORDER — PROMETHAZINE HYDROCHLORIDE 25 MG/1
25 SUPPOSITORY RECTAL EVERY 6 HOURS PRN
Qty: 30 SUPPOSITORY | Refills: 1 | Status: ON HOLD | OUTPATIENT
Start: 2022-01-10 | End: 2022-06-17

## 2022-01-10 NOTE — PROGRESS NOTES
Initial ob visit     CC- Here for care of pregnancy        Lorie Lara is a 36 y.o. female, , who presents for her first obstetrical visit.  Her last LMP was Patient's last menstrual period was 2021 (exact date)..    OB History    Para Term  AB Living   3 1   1   1   SAB IAB Ectopic Molar Multiple Live Births             1      # Outcome Date GA Lbr Femi/2nd Weight Sex Delivery Anes PTL Lv   3 Current            2  19 36w6d  2781 g (6 lb 2.1 oz) F Vag-Spont EPI Y RONDA      Birth Comments: GDM, Hypertension, Low fluid   1                 Initial positive test date : 2021, UPT      ; 2021 HCG     Prior obstetric issues, potential pregnancy concerns: GDM, Hypertension, low amniotic fluid towards the end of her last pregnancy.   Family history of genetic issues (includes FOB): Trisomy 18 her brothers daughter  Prior infections concerning in pregnancy (Rash, fever in last 2 weeks): None  Varicella Hx -yes  Prior testing for Cystic Fibrosis Carrier or Sickle Cell Trait-  none  Prepregnancy BMI - Body mass index is 47.85 kg/m².  History of STD: no    Additional Pertinent History   Last Pap : 2021   Last Completed Pap Smear          PAP SMEAR (Every 3 Years) Next due on 3/9/2024    2021  Patient-Reported (Performed Externally)              History of abnormal Pap smear: no  Family history of uterine, colon, breast, or ovarian cancer: yes - Breast CA Paternal aunt and grandmother   Performs monthly Self-Breast Exam: yes  Exercises Regularly: yes  Feelings of Anxiety or Depression: no  Tobacco Usage?: No   Alcohol/Drug Use?: Yes; Marijuana in evenings, she is tapering off.   Over the age of 35 at delivery: yes  Desires Genetic Screening: Cell Free DNA    PMH  Past Medical History:   Diagnosis Date   • Disease of thyroid gland    • GERD (gastroesophageal reflux disease)    • Gestational diabetes May 2019?   • Gestational hypertension May 2019   •  Hypertension    • Upper respiratory infection    • Varicella 1990?       Current Outpatient Medications:   •  levothyroxine (SYNTHROID, LEVOTHROID) 100 MCG tablet, Take 1 tablet by mouth Daily., Disp: 90 tablet, Rfl: 2  •  azelastine (ASTELIN) 0.1 % nasal spray, 2 sprays into the nostril(s) as directed by provider 2 (Two) Times a Day As Needed for Rhinitis. Use in each nostril as directed, Disp: 30 mL, Rfl: 11  •  ondansetron ODT (Zofran ODT) 4 MG disintegrating tablet, Place 1 tablet on the tongue Every 8 (Eight) Hours As Needed for Nausea or Vomiting., Disp: 10 tablet, Rfl: 0  •  promethazine (PHENERGAN) 25 MG suppository, Insert 1 suppository into the rectum Every 6 (Six) Hours As Needed for Nausea or Vomiting., Disp: 30 suppository, Rfl: 1  •  promethazine (PHENERGAN) 25 MG tablet, Take 1 tablet by mouth Every 6 (Six) Hours As Needed for Nausea or Vomiting., Disp: 30 tablet, Rfl: 0    The additional following portions of the patient's history were reviewed and updated as appropriate: allergies, current medications, past family history, past medical history, past social history, past surgical history and problem list.    Review of Systems   Review of Systems  Current obstetric complaints : none   All systems reviewed and otherwise normal.    I have reviewed and agree with the HPI, ROS, and historical information as entered above. Christa Driver MD    /74   Wt 111 kg (245 lb)   LMP 11/13/2021 (Exact Date)   BMI 47.85 kg/m²     Physical Exam  General Appearance:    Alert, cooperative, in no acute distress, obese   Head:    Normocephalic, without obvious abnormality, atraumatic   Neck:   No adenopathy, supple, trachea midline, no thyromegaly   Back:     No kyphosis present, no scoliosis present,                       Lungs:     Clear to auscultation,respirations regular, even and     unlabored    Heart:    Regular rhythm and normal rate, normal S1 and S2, no            murmur, no gallop, no rub, no  click       Abdomen:     Normal bowel sounds, no masses, no organomegaly, soft        non-tender, non-distended, no guarding, no rebound                 tenderness       Extremities:   Moves all extremities well, no edema, no cyanosis, no         redness   Pulses:   Pulses palpable and equal bilaterally   Skin:   No bleeding, bruising or rash   Lymph nodes:   No palpable adenopathy   Neurologic:   Sensation intact, A&O times 3      Physical Exam has been reviewed and confirmed by Christa Driver MD    Assessment/Plan   Assessment     Problem List Items Addressed This Visit     Thyroid disorder    Relevant Medications    levothyroxine (SYNTHROID, LEVOTHROID) 100 MCG tablet    History of gestational hypertension    History of gestational diabetes    Prenatal care, subsequent pregnancy, first trimester - Primary    Relevant Orders    US Ob Transvaginal (Completed)    Obstetric Panel (Completed)    HIV-1 / O / 2 Ag / Antibody 4th Generation (Completed)    Urine Culture - Urine, Urine, Clean Catch (Completed)    Chlamydia trachomatis, Neisseria gonorrhoeae, PCR - Urine, Urine, Clean Catch (Completed)    TSH (Completed)    Hemoglobin A1c (Completed)    Urine Drug Screen - Urine, Clean Catch    BMI 45.0-49.9, adult (HCC)    Multigravida of advanced maternal age in first trimester          1. Pregnancy at 8w2d  Diagnoses and all orders for this visit:    1. Prenatal care, subsequent pregnancy, first trimester (Primary)  -     US Ob Transvaginal  -     Obstetric Panel  -     Cancel: Hepatitis C Antibody  -     HIV-1 / O / 2 Ag / Antibody 4th Generation  -     Urine Culture - Urine, Urine, Clean Catch  -     Chlamydia trachomatis, Neisseria gonorrhoeae, PCR - Urine, Urine, Clean Catch  -     TSH  -     Hemoglobin A1c  -     Urine Drug Screen - Urine, Clean Catch    2. History of gestational diabetes    3. Thyroid disorder    4. History of gestational hypertension    5. BMI 45.0-49.9, adult (HCC)    6. Multigravida of  advanced maternal age in first trimester    Other orders  -     promethazine (PHENERGAN) 25 MG suppository; Insert 1 suppository into the rectum Every 6 (Six) Hours As Needed for Nausea or Vomiting.  Dispense: 30 suppository; Refill: 1  -     promethazine (PHENERGAN) 25 MG tablet; Take 1 tablet by mouth Every 6 (Six) Hours As Needed for Nausea or Vomiting.  Dispense: 30 tablet; Refill: 0  -     Please Note          Plan     1. Reviewed routine prenatal care with the office and educational materials given  2. Lab(s) Ordered  3. Discussed options for genetic testing including first trimester nuchal translucency screen, genetic disease carrier testing, quadruple screen, and [place].  4. Medication(s) Ordered  5. Discontinue the use of all non-medicinal drugs and chemicals  6. Nausea/Vomiting - she does not desire medications at this time.  Discussed conservative ways to help with nausea.  7. Reviewed TSH/T4 and A1c on today's labs  8. Return in about 4 weeks (around 2/7/2022).      Christa Driver MD  01/10/2022

## 2022-01-12 LAB
ABO GROUP BLD: NORMAL
BACTERIA UR CULT: NORMAL
BACTERIA UR CULT: NORMAL
BASOPHILS # BLD AUTO: 0 X10E3/UL (ref 0–0.2)
BASOPHILS NFR BLD AUTO: 0 %
BLD GP AB SCN SERPL QL: NEGATIVE
C TRACH RRNA SPEC QL NAA+PROBE: NEGATIVE
EOSINOPHIL # BLD AUTO: 0.1 X10E3/UL (ref 0–0.4)
EOSINOPHIL NFR BLD AUTO: 1 %
ERYTHROCYTE [DISTWIDTH] IN BLOOD BY AUTOMATED COUNT: 13.1 % (ref 11.7–15.4)
HBA1C MFR BLD: 5.7 % (ref 4.8–5.6)
HBV SURFACE AG SERPL QL IA: NEGATIVE
HCT VFR BLD AUTO: 41.2 % (ref 34–46.6)
HCV AB S/CO SERPL IA: 0.1 S/CO RATIO (ref 0–0.9)
HGB BLD-MCNC: 14.1 G/DL (ref 11.1–15.9)
HIV 1+2 AB+HIV1 P24 AG SERPL QL IA: NON REACTIVE
IMM GRANULOCYTES # BLD AUTO: 0 X10E3/UL (ref 0–0.1)
IMM GRANULOCYTES NFR BLD AUTO: 0 %
LYMPHOCYTES # BLD AUTO: 2.5 X10E3/UL (ref 0.7–3.1)
LYMPHOCYTES NFR BLD AUTO: 25 %
Lab: NORMAL
MCH RBC QN AUTO: 29.9 PG (ref 26.6–33)
MCHC RBC AUTO-ENTMCNC: 34.2 G/DL (ref 31.5–35.7)
MCV RBC AUTO: 88 FL (ref 79–97)
MONOCYTES # BLD AUTO: 0.7 X10E3/UL (ref 0.1–0.9)
MONOCYTES NFR BLD AUTO: 7 %
N GONORRHOEA RRNA SPEC QL NAA+PROBE: NEGATIVE
NEUTROPHILS # BLD AUTO: 6.5 X10E3/UL (ref 1.4–7)
NEUTROPHILS NFR BLD AUTO: 67 %
PLATELET # BLD AUTO: 246 X10E3/UL (ref 150–450)
RBC # BLD AUTO: 4.71 X10E6/UL (ref 3.77–5.28)
RH BLD: POSITIVE
RPR SER QL: NON REACTIVE
RUBV IGG SERPL IA-ACNC: 18.1 INDEX
TSH SERPL DL<=0.005 MIU/L-ACNC: 2.35 UIU/ML (ref 0.45–4.5)
WBC # BLD AUTO: 9.8 X10E3/UL (ref 3.4–10.8)

## 2022-01-19 DIAGNOSIS — Z3A.10 10 WEEKS GESTATION OF PREGNANCY: Primary | ICD-10-CM

## 2022-01-31 ENCOUNTER — LAB (OUTPATIENT)
Dept: OBSTETRICS AND GYNECOLOGY | Facility: CLINIC | Age: 37
End: 2022-01-31

## 2022-01-31 LAB — EXTERNAL GENETIC TESTING, MATERNAL BLOOD: NORMAL

## 2022-02-07 ENCOUNTER — ROUTINE PRENATAL (OUTPATIENT)
Dept: OBSTETRICS AND GYNECOLOGY | Facility: CLINIC | Age: 37
End: 2022-02-07

## 2022-02-07 VITALS — WEIGHT: 245 LBS | DIASTOLIC BLOOD PRESSURE: 85 MMHG | SYSTOLIC BLOOD PRESSURE: 124 MMHG | BODY MASS INDEX: 47.85 KG/M2

## 2022-02-07 DIAGNOSIS — Z86.32 HISTORY OF GESTATIONAL DIABETES: ICD-10-CM

## 2022-02-07 DIAGNOSIS — O09.521 MULTIGRAVIDA OF ADVANCED MATERNAL AGE IN FIRST TRIMESTER: ICD-10-CM

## 2022-02-07 DIAGNOSIS — Z87.59 HISTORY OF GESTATIONAL HYPERTENSION: ICD-10-CM

## 2022-02-07 DIAGNOSIS — Z34.81 PRENATAL CARE, SUBSEQUENT PREGNANCY, FIRST TRIMESTER: Primary | ICD-10-CM

## 2022-02-07 LAB
5P15 DELETION (CRI-DU-CHAT): NOT DETECTED
CFDNA.FET/CFDNA.TOTAL SFR FETUS: NORMAL %
CITATION REF LAB TEST: NORMAL
FET 13+18+21+X+Y ANEUP PLAS.CFDNA: NEGATIVE
FET 1P36 DEL RISK WBC.DNA+CFDNA QL: NOT DETECTED
FET 22Q11.2 DEL RISK WBC.DNA+CFDNA QL: NOT DETECTED
FET CHR 11Q23 DEL PLAS.CFDNA QL: NOT DETECTED
FET CHR 15Q11 DEL PLAS.CFDNA QL: NOT DETECTED
FET CHR 21 TS PLAS.CFDNA QL: NEGATIVE
FET CHR 4P16 DEL PLAS.CFDNA QL: NOT DETECTED
FET CHR 8Q24 DEL PLAS.CFDNA QL: NOT DETECTED
FET MS X RISK WBC.DNA+CFDNA QL: NOT DETECTED
FET SEX PLAS.CFDNA DOSAGE CFDNA: NORMAL
FET TS 13 RISK PLAS.CFDNA QL: NEGATIVE
FET TS 18 RISK WBC.DNA+CFDNA QL: NEGATIVE
FET X + Y ANEUP RISK PLAS.CFDNA SEQ-IMP: NOT DETECTED
GA EST FROM CONCEPTION DATE: NORMAL D
GESTATIONAL AGE > 9:: YES
GLUCOSE UR STRIP-MCNC: NEGATIVE MG/DL
LAB DIRECTOR NAME PROVIDER: NORMAL
LAB DIRECTOR NAME PROVIDER: NORMAL
LABORATORY COMMENT REPORT: NORMAL
LIMITATIONS OF THE TEST: NORMAL
NEGATIVE PREDICTIVE VALUE: NORMAL
NOTE: NORMAL
PERFORMANCE CHARACTERISTICS: NORMAL
POSITIVE PREDICTIVE VALUE: NORMAL
PROT UR STRIP-MCNC: NEGATIVE MG/DL
REF LAB TEST METHOD: NORMAL
TEST PERFORMANCE INFO SPEC: NORMAL
TRIOSOMY 16: NOT DETECTED
TRISOMY 22: NOT DETECTED

## 2022-02-07 PROCEDURE — 0502F SUBSEQUENT PRENATAL CARE: CPT | Performed by: OBSTETRICS & GYNECOLOGY

## 2022-02-07 NOTE — PROGRESS NOTES
OB FOLLOW UP    Lorie Lara is a 36 y.o.  12w2d patient being seen today for her obstetrical follow up visit. Patient reports no complaints. Rev PNL/O+. Doing 1hr gtt today, UDS sent tdy    Her prenatal care is complicated by (and status) :    Patient Active Problem List   Diagnosis   • BMI 39.0-39.9,adult   • Right low back pain   • Thyroid disorder   • Vitamin D deficiency   • Upper respiratory tract infection   • Candidiasis of skin   • Disorder of thyroid   • Vitamin D deficiency   • History of gestational hypertension   • History of gestational diabetes   • Prenatal care, subsequent pregnancy, first trimester   • BMI 45.0-49.9, adult (HCC)   • Multigravida of advanced maternal age in first trimester       ROS -   Patient Reports : No Problems  Patient Denies: Loss of Fluid, Vaginal Spotting, Vision Changes, Headaches and Cramping/Contractions   Fetal Movement : absent      /85   Wt 111 kg (245 lb)   LMP 2021 (Exact Date)   BMI 47.85 kg/m²     Ultrasound Today: no    EXAM:  Vitals: See prenatal flowsheet   Abdomen: See prenatal flowsheet   Urine glucose/protein: See prenatal flowsheet   Pelvic: See prenatal flowsheet     Assessment    Diagnoses and all orders for this visit:    1. Prenatal care, subsequent pregnancy, first trimester (Primary)  -     Urine Drug Screen - Urine, Clean Catch  -     POC Urinalysis Dipstick  -     Gestational Screen 1 Hr ([facility])    2. History of gestational diabetes    3. History of gestational hypertension    4. Multigravida of advanced maternal age in first trimester    5. BMI 45.0-49.9, adult (HCC)    Other orders  -     Please Note        1. Pregnancy at 12w2d  2. Fetal status reassuring     Problem List Items Addressed This Visit     History of gestational hypertension    History of gestational diabetes    Prenatal care, subsequent pregnancy, first trimester - Primary    Relevant Orders    Urine Drug Screen - Urine, Clean Catch (Completed)    POC  Urinalysis Dipstick (Completed)    Gestational Screen 1 Hr ([facility]) (Completed)    BMI 45.0-49.9, adult (HCC)    Multigravida of advanced maternal age in first trimester          Plan    1. Fetal movement/ Labor Precautions  2. Awaiting [id number] results, 1hr today  3. Feeling much better  Follow Up: Return in about 4 weeks (around 3/7/2022) for Next scheduled follow up.        Christa Driver MD  2022

## 2022-02-08 LAB — GLUCOSE 1H P 50 G GLC PO SERPL-MCNC: 111 MG/DL (ref 65–139)

## 2022-02-09 ENCOUNTER — TELEPHONE (OUTPATIENT)
Dept: OBSTETRICS AND GYNECOLOGY | Facility: CLINIC | Age: 37
End: 2022-02-09

## 2022-02-09 LAB
AMPHETAMINES UR QL SCN: NEGATIVE NG/ML
BARBITURATES UR QL SCN: NEGATIVE NG/ML
BENZODIAZ UR QL SCN: NEGATIVE NG/ML
BZE UR QL SCN: NEGATIVE NG/ML
CANNABINOIDS UR QL SCN: POSITIVE NG/ML
CREAT UR-MCNC: 63.8 MG/DL (ref 20–300)
LABORATORY COMMENT REPORT: ABNORMAL
Lab: NORMAL
METHADONE UR QL SCN: NEGATIVE NG/ML
OPIATES UR QL SCN: NEGATIVE NG/ML
OXYCODONE+OXYMORPHONE UR QL SCN: NEGATIVE NG/ML
PCP UR QL: NEGATIVE NG/ML
PH UR: 6.8 [PH] (ref 4.5–8.9)
PROPOXYPH UR QL SCN: NEGATIVE NG/ML

## 2022-02-09 RX ORDER — ONDANSETRON 4 MG/1
8 TABLET, FILM COATED ORAL EVERY 8 HOURS PRN
Qty: 30 TABLET | Refills: 1 | Status: SHIPPED | OUTPATIENT
Start: 2022-02-09 | End: 2022-05-10

## 2022-02-09 NOTE — TELEPHONE ENCOUNTER
Patient reports vomiting began yesterday at 3am; has been continuous since then, unless she sleeps. Preggy pops have not helped. Unable to keep fluids down. Provided reassurance that dehydration should not have set in yet, since she is still able to produce urine and tears. Took last oral phenergan at 0830, but may have thrown it up (emesis at 0840-dilip). Has not tried the phenergan suppositories yet. Informed patient that this RN will notify MD and see if zofran ODT may be called in. Pharmacy verified.

## 2022-02-09 NOTE — TELEPHONE ENCOUNTER
Patient called and stated that she has been extremely nauseated and is vomiting constantly and cannot keep anything down, patient requested to speak with nurse for further advisement.

## 2022-02-10 ENCOUNTER — LAB (OUTPATIENT)
Dept: LAB | Facility: HOSPITAL | Age: 37
End: 2022-02-10

## 2022-02-10 ENCOUNTER — TELEPHONE (OUTPATIENT)
Dept: OBSTETRICS AND GYNECOLOGY | Facility: CLINIC | Age: 37
End: 2022-02-10

## 2022-02-10 DIAGNOSIS — Z03.818 ENCOUNTER FOR PATIENT CONCERN ABOUT EXPOSURE TO INFECTIOUS ORGANISM: Primary | ICD-10-CM

## 2022-02-10 LAB — SARS-COV-2 RNA NOSE QL NAA+PROBE: NOT DETECTED

## 2022-02-10 PROCEDURE — U0004 COV-19 TEST NON-CDC HGH THRU: HCPCS

## 2022-02-10 NOTE — TELEPHONE ENCOUNTER
She has sinus drainage and congestion and and just threw up a carbonated beverageand threw that up. She will need a letter to the [facility] for missed work. She has cracker and will cont to drink as much fluids as possible.

## 2022-02-10 NOTE — TELEPHONE ENCOUNTER
PT IS STILL STRUGGLING AND FEELING TERRIBLE, PLEASE SEE LAST NOTE, GOING TO TRY TO GO GET COVID TEST DONE IN [place] BUT IS NEEDING TO SPEAK WITH NURSE

## 2022-02-10 NOTE — TELEPHONE ENCOUNTER
She is 12w5d  she was given Zofran yesterday. She felt a little panicked this morning when she called. She works from home and it sitting in the bath tub.

## 2022-02-10 NOTE — TELEPHONE ENCOUNTER
Her cheeks feel flushed, no fever. She is planning to go to [facility] for drive thru Covid test but they do not do [place]. She is not due the phen supp or zofran had been absorbed because she threw up and had a BM. Cont to rest and stay hydrated. Call back as needed.

## 2022-03-04 ENCOUNTER — HOSPITAL ENCOUNTER (EMERGENCY)
Facility: HOSPITAL | Age: 37
Discharge: HOME | End: 2022-03-04
Attending: EMERGENCY MEDICINE | Admitting: EMERGENCY MEDICINE

## 2022-03-04 VITALS
BODY MASS INDEX: 45.82 KG/M2 | TEMPERATURE: 98.3 F | SYSTOLIC BLOOD PRESSURE: 136 MMHG | HEART RATE: 82 BPM | OXYGEN SATURATION: 98 % | RESPIRATION RATE: 16 BRPM | DIASTOLIC BLOOD PRESSURE: 84 MMHG | HEIGHT: 60 IN | WEIGHT: 233.4 LBS

## 2022-03-04 DIAGNOSIS — E87.6 HYPOKALEMIA: Primary | ICD-10-CM

## 2022-03-04 DIAGNOSIS — N30.00 ACUTE CYSTITIS WITHOUT HEMATURIA: ICD-10-CM

## 2022-03-04 LAB
ALBUMIN SERPL-MCNC: 4.2 G/DL (ref 3.5–5.2)
ALBUMIN/GLOB SERPL: 1.2 G/DL
ALP SERPL-CCNC: 54 U/L (ref 39–117)
ALT SERPL W P-5'-P-CCNC: 38 U/L (ref 1–33)
ANION GAP SERPL CALCULATED.3IONS-SCNC: 14.5 MMOL/L (ref 5–15)
AST SERPL-CCNC: 35 U/L (ref 1–32)
BACTERIA UR QL AUTO: ABNORMAL /HPF
BASOPHILS # BLD AUTO: 0.01 10*3/MM3 (ref 0–0.2)
BASOPHILS NFR BLD AUTO: 0.1 % (ref 0–1.5)
BILIRUB SERPL-MCNC: 0.3 MG/DL (ref 0–1.2)
BILIRUB UR QL STRIP: NEGATIVE
BUN SERPL-MCNC: 13 MG/DL (ref 6–20)
BUN/CREAT SERPL: 20 (ref 7–25)
CALCIUM SPEC-SCNC: 9.4 MG/DL (ref 8.6–10.5)
CHLORIDE SERPL-SCNC: 87 MMOL/L (ref 98–107)
CLARITY UR: ABNORMAL
CO2 SERPL-SCNC: 25.5 MMOL/L (ref 22–29)
COLOR UR: ABNORMAL
CREAT SERPL-MCNC: 0.65 MG/DL (ref 0.57–1)
DEPRECATED RDW RBC AUTO: 42.1 FL (ref 37–54)
EGFRCR SERPLBLD CKD-EPI 2021: 117.9 ML/MIN/1.73
EOSINOPHIL # BLD AUTO: 0 10*3/MM3 (ref 0–0.4)
EOSINOPHIL NFR BLD AUTO: 0 % (ref 0.3–6.2)
ERYTHROCYTE [DISTWIDTH] IN BLOOD BY AUTOMATED COUNT: 13.5 % (ref 12.3–15.4)
GLOBULIN UR ELPH-MCNC: 3.6 GM/DL
GLUCOSE SERPL-MCNC: 120 MG/DL (ref 65–99)
GLUCOSE UR STRIP-MCNC: NEGATIVE MG/DL
HCT VFR BLD AUTO: 41.2 % (ref 34–46.6)
HGB BLD-MCNC: 15 G/DL (ref 12–15.9)
HGB UR QL STRIP.AUTO: NEGATIVE
HYALINE CASTS UR QL AUTO: ABNORMAL /LPF
IMM GRANULOCYTES # BLD AUTO: 0.03 10*3/MM3 (ref 0–0.05)
IMM GRANULOCYTES NFR BLD AUTO: 0.4 % (ref 0–0.5)
KETONES UR QL STRIP: ABNORMAL
LEUKOCYTE ESTERASE UR QL STRIP.AUTO: NEGATIVE
LIPASE SERPL-CCNC: 14 U/L (ref 13–60)
LYMPHOCYTES # BLD AUTO: 0.63 10*3/MM3 (ref 0.7–3.1)
LYMPHOCYTES NFR BLD AUTO: 8.5 % (ref 19.6–45.3)
MAGNESIUM SERPL-MCNC: 1.8 MG/DL (ref 1.6–2.6)
MCH RBC QN AUTO: 30.9 PG (ref 26.6–33)
MCHC RBC AUTO-ENTMCNC: 36.4 G/DL (ref 31.5–35.7)
MCV RBC AUTO: 84.9 FL (ref 79–97)
MONOCYTES # BLD AUTO: 0.54 10*3/MM3 (ref 0.1–0.9)
MONOCYTES NFR BLD AUTO: 7.3 % (ref 5–12)
MUCOUS THREADS URNS QL MICRO: ABNORMAL /HPF
NEUTROPHILS NFR BLD AUTO: 6.18 10*3/MM3 (ref 1.7–7)
NEUTROPHILS NFR BLD AUTO: 83.7 % (ref 42.7–76)
NITRITE UR QL STRIP: NEGATIVE
NRBC BLD AUTO-RTO: 0 /100 WBC (ref 0–0.2)
PH UR STRIP.AUTO: 6 [PH] (ref 5–8)
PLATELET # BLD AUTO: 197 10*3/MM3 (ref 140–450)
PMV BLD AUTO: 8.8 FL (ref 6–12)
POTASSIUM SERPL-SCNC: 2.6 MMOL/L (ref 3.5–5.2)
PROT SERPL-MCNC: 7.8 G/DL (ref 6–8.5)
PROT UR QL STRIP: ABNORMAL
RBC # BLD AUTO: 4.85 10*6/MM3 (ref 3.77–5.28)
RBC # UR STRIP: ABNORMAL /HPF
REF LAB TEST METHOD: ABNORMAL
SODIUM SERPL-SCNC: 127 MMOL/L (ref 136–145)
SP GR UR STRIP: 1.03 (ref 1–1.03)
SQUAMOUS #/AREA URNS HPF: ABNORMAL /HPF
UROBILINOGEN UR QL STRIP: ABNORMAL
WBC # UR STRIP: ABNORMAL /HPF
WBC NRBC COR # BLD: 7.39 10*3/MM3 (ref 3.4–10.8)

## 2022-03-04 PROCEDURE — 25010000002 ONDANSETRON PER 1 MG: Performed by: PHYSICIAN ASSISTANT

## 2022-03-04 PROCEDURE — 81001 URINALYSIS AUTO W/SCOPE: CPT | Performed by: PHYSICIAN ASSISTANT

## 2022-03-04 PROCEDURE — 96361 HYDRATE IV INFUSION ADD-ON: CPT

## 2022-03-04 PROCEDURE — 85025 COMPLETE CBC W/AUTO DIFF WBC: CPT | Performed by: PHYSICIAN ASSISTANT

## 2022-03-04 PROCEDURE — 0 POTASSIUM CHLORIDE 10 MEQ/100ML SOLUTION: Performed by: PHYSICIAN ASSISTANT

## 2022-03-04 PROCEDURE — 83690 ASSAY OF LIPASE: CPT | Performed by: PHYSICIAN ASSISTANT

## 2022-03-04 PROCEDURE — 25010000002 DIPHENHYDRAMINE PER 50 MG: Performed by: PHYSICIAN ASSISTANT

## 2022-03-04 PROCEDURE — 96375 TX/PRO/DX INJ NEW DRUG ADDON: CPT

## 2022-03-04 PROCEDURE — 99283 EMERGENCY DEPT VISIT LOW MDM: CPT

## 2022-03-04 PROCEDURE — 80053 COMPREHEN METABOLIC PANEL: CPT | Performed by: PHYSICIAN ASSISTANT

## 2022-03-04 PROCEDURE — 96365 THER/PROPH/DIAG IV INF INIT: CPT

## 2022-03-04 PROCEDURE — 83735 ASSAY OF MAGNESIUM: CPT | Performed by: PHYSICIAN ASSISTANT

## 2022-03-04 PROCEDURE — 25010000002 DROPERIDOL PER 5 MG: Performed by: PHYSICIAN ASSISTANT

## 2022-03-04 RX ORDER — NITROFURANTOIN 25; 75 MG/1; MG/1
100 CAPSULE ORAL ONCE
Status: COMPLETED | OUTPATIENT
Start: 2022-03-04 | End: 2022-03-04

## 2022-03-04 RX ORDER — DIPHENHYDRAMINE HYDROCHLORIDE 50 MG/ML
12.5 INJECTION INTRAMUSCULAR; INTRAVENOUS ONCE
Status: COMPLETED | OUTPATIENT
Start: 2022-03-04 | End: 2022-03-04

## 2022-03-04 RX ORDER — POTASSIUM CHLORIDE 750 MG/1
10 TABLET, FILM COATED, EXTENDED RELEASE ORAL 2 TIMES DAILY
Qty: 6 TABLET | Refills: 0 | Status: SHIPPED | OUTPATIENT
Start: 2022-03-04 | End: 2022-03-07

## 2022-03-04 RX ORDER — ONDANSETRON 4 MG/1
4 TABLET, FILM COATED ORAL EVERY 6 HOURS PRN
Qty: 12 TABLET | Refills: 0 | Status: ON HOLD | OUTPATIENT
Start: 2022-03-04 | End: 2022-06-17

## 2022-03-04 RX ORDER — POTASSIUM CHLORIDE 7.45 MG/ML
10 INJECTION INTRAVENOUS ONCE
Status: COMPLETED | OUTPATIENT
Start: 2022-03-04 | End: 2022-03-04

## 2022-03-04 RX ORDER — DROPERIDOL 2.5 MG/ML
1.25 INJECTION, SOLUTION INTRAMUSCULAR; INTRAVENOUS ONCE
Status: COMPLETED | OUTPATIENT
Start: 2022-03-04 | End: 2022-03-04

## 2022-03-04 RX ORDER — NITROFURANTOIN 25; 75 MG/1; MG/1
100 CAPSULE ORAL 2 TIMES DAILY
Qty: 10 CAPSULE | Refills: 0 | Status: SHIPPED | OUTPATIENT
Start: 2022-03-04 | End: 2022-03-09

## 2022-03-04 RX ORDER — POTASSIUM CHLORIDE 750 MG/1
40 CAPSULE, EXTENDED RELEASE ORAL ONCE
Status: COMPLETED | OUTPATIENT
Start: 2022-03-04 | End: 2022-03-04

## 2022-03-04 RX ORDER — ONDANSETRON 2 MG/ML
4 INJECTION INTRAMUSCULAR; INTRAVENOUS ONCE
Status: COMPLETED | OUTPATIENT
Start: 2022-03-04 | End: 2022-03-04

## 2022-03-04 RX ADMIN — ONDANSETRON 4 MG: 2 INJECTION INTRAMUSCULAR; INTRAVENOUS at 18:23

## 2022-03-04 RX ADMIN — SODIUM CHLORIDE 1000 ML: 9 INJECTION, SOLUTION INTRAVENOUS at 17:41

## 2022-03-04 RX ADMIN — SODIUM CHLORIDE 1000 ML: 9 INJECTION, SOLUTION INTRAVENOUS at 19:10

## 2022-03-04 RX ADMIN — NITROFURANTOIN MONOHYDRATE/MACROCRYSTALLINE 100 MG: 25; 75 CAPSULE ORAL at 20:11

## 2022-03-04 RX ADMIN — POTASSIUM CHLORIDE 40 MEQ: 750 CAPSULE, EXTENDED RELEASE ORAL at 19:45

## 2022-03-04 RX ADMIN — DROPERIDOL 1.25 MG: 2.5 INJECTION, SOLUTION INTRAMUSCULAR; INTRAVENOUS at 19:22

## 2022-03-04 RX ADMIN — POTASSIUM CHLORIDE 10 MEQ: 7.46 INJECTION, SOLUTION INTRAVENOUS at 20:53

## 2022-03-04 RX ADMIN — DIPHENHYDRAMINE HYDROCHLORIDE 12.5 MG: 50 INJECTION INTRAMUSCULAR; INTRAVENOUS at 19:22

## 2022-03-04 NOTE — ED PROVIDER NOTES
Subjective   This is a 36-year-old  female who is 6 months pregnant, with a history of hypothyroidism, presenting today complaining of nausea vomiting and diarrhea since Wednesday.  She has tried to eat popsicles, saltines, and ice chips been unable to tolerate any oral intake.  She states that she is vomiting at least 1 time per hour and has gotten very little sleep.  She has taken promethazine tablets for her symptoms with minimal relief of her symptoms.  She has promethazine suppositories but has not taken them due to continued diarrhea.  On Wednesday she had a fever of 100.1 at home which was resolved with Tylenol.  She discussed her symptoms with her OB/GYN and was encouraged not to come to the emergency department unless she was unable to urinate.  She has not urinated since this morning and states that it was very dark in color.  She has had a healthy pregnancy.  Her last pregnancy, however, he had gestational diabetes, hypertension, and decreased amniotic fluid.       History provided by:  Patient   used: No        Review of Systems    Past Medical History:   Diagnosis Date   • Disease of thyroid gland     Currently 100mcg   • GERD (gastroesophageal reflux disease)    • Gestational diabetes May 2019?   • Gestational hypertension May 2019    Gestational then managed   • Hypertension    • Upper respiratory infection    • Varicella ?       No Known Allergies    Past Surgical History:   Procedure Laterality Date   • INGUINAL HERNIA REPAIR     • TONSILLECTOMY         Family History   Problem Relation Age of Onset   • Hypertension Mother    • Thyroid disease Mother    • Arthritis Brother    • Cancer Other    • Melanoma Maternal Grandfather    • Obesity Paternal Grandmother    • Breast cancer Paternal Aunt         Just learned last year. Dont know when.       Social History     Socioeconomic History   • Marital status: Single   Tobacco Use   • Smoking status: Former Smoker      Packs/day: 1.00     Years: 12.00     Pack years: 12.00     Types: Cigarettes     Quit date: 2013     Years since quittin.2   • Smokeless tobacco: Never Used   Substance and Sexual Activity   • Alcohol use: Not Currently     Alcohol/week: 0.0 standard drinks     Comment: SOCIAL   • Drug use: Yes     Frequency: 5.0 times per week     Types: Marijuana     Comment: Use at night to relax, help sleep, also with nausea. Will X.   • Sexual activity: Yes     Partners: Male     Birth control/protection: None           Objective   Physical Exam  Vitals and nursing note reviewed.   Constitutional:       Appearance: She is well-developed.   HENT:      Head: Normocephalic and atraumatic.      Nose: Nose normal.      Mouth/Throat:      Mouth: Mucous membranes are moist.   Eyes:      Extraocular Movements: Extraocular movements intact.   Cardiovascular:      Rate and Rhythm: Normal rate and regular rhythm.   Pulmonary:      Effort: Pulmonary effort is normal.      Breath sounds: Normal breath sounds.   Abdominal:      General: Bowel sounds are normal.      Palpations: Abdomen is soft.   Musculoskeletal:         General: Normal range of motion.      Cervical back: Normal range of motion and neck supple.   Skin:     General: Skin is warm and dry.   Neurological:      Mental Status: She is alert and oriented to person, place, and time.      Deep Tendon Reflexes: Reflexes are normal and symmetric.         Procedures           ED Course                                                 MDM  Number of Diagnoses or Management Options  Acute cystitis without hematuria: new and requires workup  Hypokalemia: new and requires workup     Amount and/or Complexity of Data Reviewed  Clinical lab tests: reviewed  Tests in the radiology section of CPT®: reviewed    Risk of Complications, Morbidity, and/or Mortality  Presenting problems: low  Diagnostic procedures: low  Management options: low    Patient Progress  Patient progress:  stable      Final diagnoses:   Hypokalemia   Acute cystitis without hematuria       ED Disposition  ED Disposition     ED Disposition   Discharge    Condition   Stable    Comment   --             [facility] Emergency Department  [address] [address] [phone]    If symptoms worsen         Medication List      Changed    * ondansetron 4 MG tablet  Commonly known as: Zofran  Take 2 tablets by mouth Every 8 (Eight) Hours As Needed for Nausea or Vomiting for up to 90 days.  What changed: Another medication with the same name was added. Make sure you understand how and when to take each.     * ondansetron 4 MG tablet  Commonly known as: ZOFRAN  Take 1 tablet by mouth Every 6 (Six) Hours As Needed for Nausea.  What changed: You were already taking a medication with the same name, and this prescription was added. Make sure you understand how and when to take each.         * This list has 2 medication(s) that are the same as other medications prescribed for you. Read the directions carefully, and ask your doctor or other care provider to review them with you.            ASK your doctor about these medications    nitrofurantoin (macrocrystal-monohydrate) 100 MG capsule  Commonly known as: MACROBID  Take 1 capsule by mouth 2 (Two) Times a Day for 5 days.  Ask about: Should I take this medication?     potassium chloride 10 MEQ CR tablet  Take 1 tablet by mouth 2 (Two) Times a Day for 3 days.  Ask about: Should I take this medication?           Where to Get Your Medications      These medications were sent to [facility] [address] [place] [address] [address] [place] [address] [address] [facility] [phone] [facility] [phone] [address] [address]    Phone: [phone]   · nitrofurantoin (macrocrystal-monohydrate) 100 MG capsule  · ondansetron 4 MG tablet  · potassium chloride 10 MEQ CR tablet          Patricio Reynolds Jr., PA-C  03/04/22 2841       Patricio Reynolds  , AIDE  03/28/22 [id number]

## 2022-03-21 ENCOUNTER — ROUTINE PRENATAL (OUTPATIENT)
Dept: OBSTETRICS AND GYNECOLOGY | Facility: CLINIC | Age: 37
End: 2022-03-21

## 2022-03-21 VITALS — DIASTOLIC BLOOD PRESSURE: 100 MMHG | SYSTOLIC BLOOD PRESSURE: 140 MMHG | BODY MASS INDEX: 47.65 KG/M2 | WEIGHT: 244 LBS

## 2022-03-21 DIAGNOSIS — O09.521 MULTIGRAVIDA OF ADVANCED MATERNAL AGE IN FIRST TRIMESTER: ICD-10-CM

## 2022-03-21 DIAGNOSIS — Z3A.18 18 WEEKS GESTATION OF PREGNANCY: Primary | ICD-10-CM

## 2022-03-21 DIAGNOSIS — Z87.59 HISTORY OF GESTATIONAL HYPERTENSION: ICD-10-CM

## 2022-03-21 DIAGNOSIS — Z86.32 HISTORY OF GESTATIONAL DIABETES: ICD-10-CM

## 2022-03-21 LAB
GLUCOSE UR STRIP-MCNC: NEGATIVE MG/DL
PROT UR STRIP-MCNC: NEGATIVE MG/DL

## 2022-03-21 PROCEDURE — 0502F SUBSEQUENT PRENATAL CARE: CPT | Performed by: OBSTETRICS & GYNECOLOGY

## 2022-03-23 LAB
AFP INTERP SERPL-IMP: NORMAL
AFP INTERP SERPL-IMP: NORMAL
AFP MOM SERPL: 0.79
AFP SERPL-MCNC: 26.9 NG/ML
AGE AT DELIVERY: 35.9 YR
GA METHOD: NORMAL
GA: 18.3 WEEKS
IDDM PATIENT QL: NO
LABORATORY COMMENT REPORT: NORMAL
MULTIPLE PREGNANCY: NO
NEURAL TUBE DEFECT RISK FETUS: NORMAL %
RESULT: NORMAL

## 2022-04-11 ENCOUNTER — ROUTINE PRENATAL (OUTPATIENT)
Dept: OBSTETRICS AND GYNECOLOGY | Facility: CLINIC | Age: 37
End: 2022-04-11

## 2022-04-11 VITALS — SYSTOLIC BLOOD PRESSURE: 130 MMHG | BODY MASS INDEX: 48.59 KG/M2 | DIASTOLIC BLOOD PRESSURE: 88 MMHG | WEIGHT: 248.8 LBS

## 2022-04-11 DIAGNOSIS — O09.521 MULTIGRAVIDA OF ADVANCED MATERNAL AGE IN FIRST TRIMESTER: ICD-10-CM

## 2022-04-11 DIAGNOSIS — Z86.32 HISTORY OF GESTATIONAL DIABETES: ICD-10-CM

## 2022-04-11 DIAGNOSIS — Z87.59 HISTORY OF GESTATIONAL HYPERTENSION: ICD-10-CM

## 2022-04-11 DIAGNOSIS — Z3A.21 21 WEEKS GESTATION OF PREGNANCY: Primary | ICD-10-CM

## 2022-04-11 DIAGNOSIS — E07.9 DISORDER OF THYROID: ICD-10-CM

## 2022-04-11 LAB
EXPIRATION DATE: 0
GLUCOSE UR STRIP-MCNC: NEGATIVE MG/DL
Lab: 0
PROT UR STRIP-MCNC: NEGATIVE MG/DL

## 2022-04-11 PROCEDURE — 0502F SUBSEQUENT PRENATAL CARE: CPT | Performed by: OBSTETRICS & GYNECOLOGY

## 2022-05-09 ENCOUNTER — ROUTINE PRENATAL (OUTPATIENT)
Dept: OBSTETRICS AND GYNECOLOGY | Facility: CLINIC | Age: 37
End: 2022-05-09

## 2022-05-09 VITALS — BODY MASS INDEX: 48.9 KG/M2 | DIASTOLIC BLOOD PRESSURE: 70 MMHG | SYSTOLIC BLOOD PRESSURE: 125 MMHG | WEIGHT: 250.4 LBS

## 2022-05-09 DIAGNOSIS — O09.521 MULTIGRAVIDA OF ADVANCED MATERNAL AGE IN FIRST TRIMESTER: ICD-10-CM

## 2022-05-09 DIAGNOSIS — Z86.32 HISTORY OF GESTATIONAL DIABETES: ICD-10-CM

## 2022-05-09 DIAGNOSIS — E07.9 THYROID DISORDER: ICD-10-CM

## 2022-05-09 DIAGNOSIS — Z87.59 HISTORY OF GESTATIONAL HYPERTENSION: ICD-10-CM

## 2022-05-09 DIAGNOSIS — Z3A.25 25 WEEKS GESTATION OF PREGNANCY: Primary | ICD-10-CM

## 2022-05-09 PROBLEM — B37.2 CANDIDIASIS OF SKIN: Status: RESOLVED | Noted: 2021-06-26 | Resolved: 2022-05-09

## 2022-05-09 PROBLEM — J06.9 UPPER RESPIRATORY TRACT INFECTION: Status: RESOLVED | Noted: 2021-06-26 | Resolved: 2022-05-09

## 2022-05-09 PROBLEM — Z3A.18 18 WEEKS GESTATION OF PREGNANCY: Status: RESOLVED | Noted: 2022-03-21 | Resolved: 2022-05-09

## 2022-05-09 PROBLEM — Z34.81 PRENATAL CARE, SUBSEQUENT PREGNANCY, FIRST TRIMESTER: Status: RESOLVED | Noted: 2022-01-10 | Resolved: 2022-05-09

## 2022-05-09 LAB
EXPIRATION DATE: 0
GLUCOSE UR STRIP-MCNC: NEGATIVE MG/DL
Lab: 0
PROT UR STRIP-MCNC: NEGATIVE MG/DL

## 2022-05-09 PROCEDURE — 0502F SUBSEQUENT PRENATAL CARE: CPT | Performed by: OBSTETRICS & GYNECOLOGY

## 2022-05-09 NOTE — PROGRESS NOTES
OB FOLLOW UP    Lorie Lara is a 36 y.o.  25w2d patient being seen today for her obstetrical follow up visit. Patient reports headache, heartburn, nausea, rarely has vomiting in the morning.    Her prenatal care is complicated by (and status) :    Patient Active Problem List   Diagnosis   • Thyroid disorder   • Vitamin D deficiency   • Disorder of thyroid   • Vitamin D deficiency   • History of gestational hypertension   • History of gestational diabetes   • BMI 45.0-49.9, adult (HCC)   • Multigravida of advanced maternal age in first trimester       ROS -   Patient Reports : headache, heartburn, nausea, rarely has vomiting in the morning  Patient Denies: Loss of Fluid, Vaginal Spotting, Vision Changes and Cramping/Contractions   Fetal Movement : normal      /70   Wt 114 kg (250 lb 6.4 oz)   LMP 2021 (Exact Date)   BMI 48.90 kg/m²     Ultrasound Today: yes, follow up fetal anatomy scan    EXAM:  Vitals: See prenatal flowsheet   Abdomen: See prenatal flowsheet   Urine glucose/protein: See prenatal flowsheet   Pelvic: See prenatal flowsheet     Assessment    Diagnoses and all orders for this visit:    1. 25 weeks gestation of pregnancy (Primary)  -     POC Protein, Urine, Qualitative, Dipstick  -     POC Glucose, Urine, Qualitative, Dipstick    2. Multigravida of advanced maternal age in first trimester    3. History of gestational diabetes    4. BMI 45.0-49.9, adult (HCC)    5. History of gestational hypertension  Comments:  on bASA    6. Thyroid disorder        1. Pregnancy at 25w2d  2. Fetal status reassuring     Problem List Items Addressed This Visit     Thyroid disorder    Relevant Medications    levothyroxine (SYNTHROID, LEVOTHROID) 100 MCG tablet    History of gestational hypertension    History of gestational diabetes    BMI 45.0-49.9, adult (HCC)    Multigravida of advanced maternal age in first trimester      Other Visit Diagnoses     25 weeks gestation of pregnancy    -  Primary     Relevant Orders    POC Protein, Urine, Qualitative, Dipstick (Completed)    POC Glucose, Urine, Qualitative, Dipstick (Completed)          Plan    1. Fetal movement/ Labor Precautions  2. Comfort measures  3. Reviewed normal f/u anatomy scan  4. Diet/exercise precautions  5. Reviewed Pre-eclampsia signs/symptoms  6. Discussed bASA for PIH prevention from 12 to 36wk   7. Return in about 4 weeks (around 2022) for Next scheduled follow up.          Christa Driver MD  2022

## 2022-06-06 ENCOUNTER — ROUTINE PRENATAL (OUTPATIENT)
Dept: OBSTETRICS AND GYNECOLOGY | Facility: CLINIC | Age: 37
End: 2022-06-06

## 2022-06-06 VITALS — WEIGHT: 247.6 LBS | DIASTOLIC BLOOD PRESSURE: 90 MMHG | BODY MASS INDEX: 48.36 KG/M2 | SYSTOLIC BLOOD PRESSURE: 140 MMHG

## 2022-06-06 DIAGNOSIS — E07.9 DISORDER OF THYROID: ICD-10-CM

## 2022-06-06 DIAGNOSIS — Z3A.29 29 WEEKS GESTATION OF PREGNANCY: Primary | ICD-10-CM

## 2022-06-06 DIAGNOSIS — O09.521 MULTIGRAVIDA OF ADVANCED MATERNAL AGE IN FIRST TRIMESTER: ICD-10-CM

## 2022-06-06 DIAGNOSIS — Z86.32 HISTORY OF GESTATIONAL DIABETES: ICD-10-CM

## 2022-06-06 DIAGNOSIS — Z87.59 HISTORY OF GESTATIONAL HYPERTENSION: ICD-10-CM

## 2022-06-06 PROCEDURE — 90471 IMMUNIZATION ADMIN: CPT | Performed by: OBSTETRICS & GYNECOLOGY

## 2022-06-06 PROCEDURE — 0502F SUBSEQUENT PRENATAL CARE: CPT | Performed by: OBSTETRICS & GYNECOLOGY

## 2022-06-06 PROCEDURE — 90715 TDAP VACCINE 7 YRS/> IM: CPT | Performed by: OBSTETRICS & GYNECOLOGY

## 2022-06-06 NOTE — PROGRESS NOTES
OB FOLLOW UP  CC- Here for care of pregnancy        Lorie Lara is a 36 y.o.  29w2d patient being seen today for her obstetrical follow up. Patient reports occasional headaches.     Patient undergoing Glucola testing today.      MBT: O+  Rhogam Given: N/A  TDAP: given today  Breast Pump: given     Ultrasound Today: No.    Her prenatal care is complicated by (and status) :    Patient Active Problem List   Diagnosis   • Thyroid disorder   • Vitamin D deficiency   • Disorder of thyroid   • Vitamin D deficiency   • History of gestational hypertension   • History of gestational diabetes   • BMI 45.0-49.9, adult (HCC)   • Multigravida of advanced maternal age in first trimester   • 29 weeks gestation of pregnancy         ROS -   Patient Reports : Headaches (occasionally)  Patient Denies: Loss of Fluid, Vaginal Spotting, Vision Changes, Nausea , Vomiting , Contractions and Epigastric pain  Fetal Movement : normal    The additional following portions of the patient's history were reviewed and updated as appropriate: allergies, current medications, past family history, past medical history, past social history, past surgical history and problem list.    I have reviewed and agree with the HPI, ROS, and historical information as entered above. Christa Driver MD    /90   Wt 112 kg (247 lb 9.6 oz)   LMP 2021 (Exact Date)   BMI 48.36 kg/m²         EXAM:     See flowsheet  Repeat /90  DTRs normal       Assessment and Plan    Problem List Items Addressed This Visit     Disorder of thyroid    Relevant Medications    levothyroxine (SYNTHROID, LEVOTHROID) 100 MCG tablet    History of gestational hypertension    History of gestational diabetes    Multigravida of advanced maternal age in first trimester    29 weeks gestation of pregnancy - Primary    Relevant Orders    TSH    Antibody Screen    CBC (No Diff)    Gestational Screen 1 Hr ([facility])    T4, Free    AlP+ALT+AST+Creat+LD+TBili+..           1. Pregnancy at 29w2d  2. Fetal status reassuring.   3. Activity and Exercise discussed.  4. Htn noted; PEP and CBC on labs today.  May be stress related.  Had friend pass away recently.  PEP precautions given  Return in about 1 week (around 6/13/2022) for Next scheduled follow up.    Christa Driver MD  06/06/2022

## 2022-06-07 ENCOUNTER — TELEPHONE (OUTPATIENT)
Dept: OBSTETRICS AND GYNECOLOGY | Facility: CLINIC | Age: 37
End: 2022-06-07

## 2022-06-07 DIAGNOSIS — O24.410 DIET CONTROLLED GESTATIONAL DIABETES MELLITUS (GDM) IN THIRD TRIMESTER: Primary | ICD-10-CM

## 2022-06-07 DIAGNOSIS — Z3A.29 29 WEEKS GESTATION OF PREGNANCY: ICD-10-CM

## 2022-06-07 DIAGNOSIS — O24.419 GESTATIONAL DIABETES MELLITUS (GDM), ANTEPARTUM, GESTATIONAL DIABETES METHOD OF CONTROL UNSPECIFIED: Primary | ICD-10-CM

## 2022-06-07 LAB
ALP SERPL-CCNC: 79 IU/L (ref 44–121)
ALT SERPL-CCNC: 18 IU/L (ref 0–32)
AST SERPL-CCNC: 17 IU/L (ref 0–40)
BASOPHILS # BLD AUTO: 0 X10E3/UL (ref 0–0.2)
BASOPHILS NFR BLD AUTO: 0 %
BILIRUB SERPL-MCNC: <0.2 MG/DL (ref 0–1.2)
BLD GP AB SCN SERPL QL: NEGATIVE
CREAT SERPL-MCNC: 0.62 MG/DL (ref 0.57–1)
EGFRCR SERPLBLD CKD-EPI 2021: 119 ML/MIN/1.73
EOSINOPHIL # BLD AUTO: 0.1 X10E3/UL (ref 0–0.4)
EOSINOPHIL NFR BLD AUTO: 1 %
ERYTHROCYTE [DISTWIDTH] IN BLOOD BY AUTOMATED COUNT: 12.9 % (ref 12.3–15.4)
ERYTHROCYTE [DISTWIDTH] IN BLOOD BY AUTOMATED COUNT: 13.2 % (ref 11.7–15.4)
GLUCOSE 1H P 50 G GLC PO SERPL-MCNC: 176 MG/DL (ref 65–139)
HCT VFR BLD AUTO: 37 % (ref 34–46.6)
HCT VFR BLD AUTO: 38.1 % (ref 34–46.6)
HGB BLD-MCNC: 12.6 G/DL (ref 12–15.9)
HGB BLD-MCNC: 12.7 G/DL (ref 11.1–15.9)
IMM GRANULOCYTES # BLD AUTO: 0.1 X10E3/UL (ref 0–0.1)
IMM GRANULOCYTES NFR BLD AUTO: 1 %
LDH SERPL-CCNC: 173 IU/L (ref 119–226)
LYMPHOCYTES # BLD AUTO: 1.7 X10E3/UL (ref 0.7–3.1)
LYMPHOCYTES NFR BLD AUTO: 20 %
MCH RBC QN AUTO: 30.1 PG (ref 26.6–33)
MCH RBC QN AUTO: 30.4 PG (ref 26.6–33)
MCHC RBC AUTO-ENTMCNC: 33.3 G/DL (ref 31.5–35.7)
MCHC RBC AUTO-ENTMCNC: 34.1 G/DL (ref 31.5–35.7)
MCV RBC AUTO: 89.2 FL (ref 79–97)
MCV RBC AUTO: 90 FL (ref 79–97)
MONOCYTES # BLD AUTO: 0.5 X10E3/UL (ref 0.1–0.9)
MONOCYTES NFR BLD AUTO: 6 %
NEUTROPHILS # BLD AUTO: 6.2 X10E3/UL (ref 1.4–7)
NEUTROPHILS NFR BLD AUTO: 72 %
PLATELET # BLD AUTO: 169 10*3/MM3 (ref 140–450)
PLATELET # BLD AUTO: 176 X10E3/UL (ref 150–450)
RBC # BLD AUTO: 4.15 10*6/MM3 (ref 3.77–5.28)
RBC # BLD AUTO: 4.22 X10E6/UL (ref 3.77–5.28)
T4 FREE SERPL-MCNC: 0.96 NG/DL (ref 0.82–1.77)
TSH SERPL DL<=0.005 MIU/L-ACNC: 1.3 UIU/ML (ref 0.27–4.2)
URATE SERPL-MCNC: 3.2 MG/DL (ref 2.6–6.2)
WBC # BLD AUTO: 8.6 X10E3/UL (ref 3.4–10.8)
WBC # BLD AUTO: 8.66 10*3/MM3 (ref 3.4–10.8)

## 2022-06-07 NOTE — TELEPHONE ENCOUNTER
Informed patient of 28wk lab results and LISY recommendations. Patient states that she is okay with not doing 3Hr GTT and f/u with diabetes education and BG monitoring. I informed patient that I will inform LISY of this. She v/u. Diabetes education referral order has been placed.

## 2022-06-07 NOTE — TELEPHONE ENCOUNTER
Thank you.  I'll also make sure she has growth and BPP at f/u.  It is ordered.  If you ladies in front would schedule please?

## 2022-06-07 NOTE — PROGRESS NOTES
Glucose just barely over, but once >175, almost surely would fail 3hr.  I would just consider diabetic and send for counseling and start FS's.  If she wants to do 3hr though, that's fine too.  Please let me know what she decides.

## 2022-06-09 ENCOUNTER — TELEPHONE (OUTPATIENT)
Dept: OBSTETRICS AND GYNECOLOGY | Facility: CLINIC | Age: 37
End: 2022-06-09

## 2022-06-13 ENCOUNTER — ROUTINE PRENATAL (OUTPATIENT)
Dept: OBSTETRICS AND GYNECOLOGY | Facility: CLINIC | Age: 37
End: 2022-06-13

## 2022-06-13 VITALS — SYSTOLIC BLOOD PRESSURE: 130 MMHG | WEIGHT: 248 LBS | DIASTOLIC BLOOD PRESSURE: 80 MMHG | BODY MASS INDEX: 48.43 KG/M2

## 2022-06-13 DIAGNOSIS — Z3A.30 30 WEEKS GESTATION OF PREGNANCY: Primary | ICD-10-CM

## 2022-06-13 DIAGNOSIS — Z87.59 HISTORY OF GESTATIONAL HYPERTENSION: ICD-10-CM

## 2022-06-13 DIAGNOSIS — O09.521 MULTIGRAVIDA OF ADVANCED MATERNAL AGE IN FIRST TRIMESTER: ICD-10-CM

## 2022-06-13 DIAGNOSIS — O24.410 GDM (GESTATIONAL DIABETES MELLITUS), CLASS A1: ICD-10-CM

## 2022-06-13 LAB
GLUCOSE UR STRIP-MCNC: NEGATIVE MG/DL
PROT UR STRIP-MCNC: NEGATIVE MG/DL

## 2022-06-13 PROCEDURE — 0502F SUBSEQUENT PRENATAL CARE: CPT | Performed by: OBSTETRICS & GYNECOLOGY

## 2022-06-17 ENCOUNTER — HOSPITAL ENCOUNTER (OUTPATIENT)
Facility: HOSPITAL | Age: 37
Setting detail: OBSERVATION
Discharge: HOME | End: 2022-06-18
Attending: OBSTETRICS & GYNECOLOGY | Admitting: OBSTETRICS & GYNECOLOGY

## 2022-06-17 ENCOUNTER — ROUTINE PRENATAL (OUTPATIENT)
Dept: OBSTETRICS AND GYNECOLOGY | Facility: CLINIC | Age: 37
End: 2022-06-17

## 2022-06-17 ENCOUNTER — TELEPHONE (OUTPATIENT)
Dept: OBSTETRICS AND GYNECOLOGY | Facility: CLINIC | Age: 37
End: 2022-06-17

## 2022-06-17 VITALS
SYSTOLIC BLOOD PRESSURE: 150 MMHG | BODY MASS INDEX: 48.08 KG/M2 | DIASTOLIC BLOOD PRESSURE: 105 MMHG | WEIGHT: 246.2 LBS

## 2022-06-17 DIAGNOSIS — Z3A.30 30 WEEKS GESTATION OF PREGNANCY: Primary | ICD-10-CM

## 2022-06-17 PROBLEM — E03.9 HYPOTHYROIDISM COMPLICATING PREGNANCY, THIRD TRIMESTER: Status: ACTIVE | Noted: 2022-06-17

## 2022-06-17 PROBLEM — O99.283 HYPOTHYROIDISM COMPLICATING PREGNANCY, THIRD TRIMESTER: Status: ACTIVE | Noted: 2022-06-17

## 2022-06-17 PROBLEM — Z86.32 HISTORY OF GESTATIONAL DIABETES: Status: RESOLVED | Noted: 2022-01-10 | Resolved: 2022-06-17

## 2022-06-17 PROBLEM — E03.9 ACQUIRED HYPOTHYROIDISM: Status: ACTIVE | Noted: 2019-01-01

## 2022-06-17 PROBLEM — O13.3 GESTATIONAL HYPERTENSION, THIRD TRIMESTER: Status: ACTIVE | Noted: 2022-06-17

## 2022-06-17 PROBLEM — O09.523 MULTIGRAVIDA OF ADVANCED MATERNAL AGE IN THIRD TRIMESTER: Status: ACTIVE | Noted: 2022-01-10

## 2022-06-17 LAB
ALP SERPL-CCNC: 79 U/L (ref 39–117)
ALT SERPL W P-5'-P-CCNC: 12 U/L (ref 1–33)
AST SERPL-CCNC: 17 U/L (ref 1–32)
BILIRUB SERPL-MCNC: 0.2 MG/DL (ref 0–1.2)
CREAT SERPL-MCNC: 0.54 MG/DL (ref 0.57–1)
DEPRECATED RDW RBC AUTO: 42.8 FL (ref 37–54)
ERYTHROCYTE [DISTWIDTH] IN BLOOD BY AUTOMATED COUNT: 13.3 % (ref 12.3–15.4)
EXPIRATION DATE: 0
GLUCOSE BLDC GLUCOMTR-MCNC: 100 MG/DL (ref 70–130)
GLUCOSE UR STRIP-MCNC: NEGATIVE MG/DL
HCT VFR BLD AUTO: 37 % (ref 34–46.6)
HGB BLD-MCNC: 12.8 G/DL (ref 12–15.9)
LDH SERPL-CCNC: 146 U/L (ref 135–214)
Lab: 0
MCH RBC QN AUTO: 30.7 PG (ref 26.6–33)
MCHC RBC AUTO-ENTMCNC: 34.6 G/DL (ref 31.5–35.7)
MCV RBC AUTO: 88.7 FL (ref 79–97)
PLATELET # BLD AUTO: 175 10*3/MM3 (ref 140–450)
PMV BLD AUTO: 9.4 FL (ref 6–12)
PROT UR STRIP-MCNC: NEGATIVE MG/DL
RBC # BLD AUTO: 4.17 10*6/MM3 (ref 3.77–5.28)
SARS-COV-2 RDRP RESP QL NAA+PROBE: NORMAL
URATE SERPL-MCNC: 5 MG/DL (ref 2.4–5.7)
WBC NRBC COR # BLD: 10.13 10*3/MM3 (ref 3.4–10.8)

## 2022-06-17 PROCEDURE — G0378 HOSPITAL OBSERVATION PER HR: HCPCS

## 2022-06-17 PROCEDURE — 83615 LACTATE (LD) (LDH) ENZYME: CPT | Performed by: OBSTETRICS & GYNECOLOGY

## 2022-06-17 PROCEDURE — 59025 FETAL NON-STRESS TEST: CPT

## 2022-06-17 PROCEDURE — 0502F SUBSEQUENT PRENATAL CARE: CPT

## 2022-06-17 PROCEDURE — C9803 HOPD COVID-19 SPEC COLLECT: HCPCS

## 2022-06-17 PROCEDURE — 84075 ASSAY ALKALINE PHOSPHATASE: CPT | Performed by: OBSTETRICS & GYNECOLOGY

## 2022-06-17 PROCEDURE — 99219 PR INITIAL OBSERVATION CARE/DAY 50 MINUTES: CPT | Performed by: OBSTETRICS & GYNECOLOGY

## 2022-06-17 PROCEDURE — 82565 ASSAY OF CREATININE: CPT | Performed by: OBSTETRICS & GYNECOLOGY

## 2022-06-17 PROCEDURE — 84450 TRANSFERASE (AST) (SGOT): CPT | Performed by: OBSTETRICS & GYNECOLOGY

## 2022-06-17 PROCEDURE — 84460 ALANINE AMINO (ALT) (SGPT): CPT | Performed by: OBSTETRICS & GYNECOLOGY

## 2022-06-17 PROCEDURE — 25010000002 BETAMETHASONE ACET & SOD PHOS PER 4 MG: Performed by: OBSTETRICS & GYNECOLOGY

## 2022-06-17 PROCEDURE — 84550 ASSAY OF BLOOD/URIC ACID: CPT | Performed by: OBSTETRICS & GYNECOLOGY

## 2022-06-17 PROCEDURE — 82962 GLUCOSE BLOOD TEST: CPT

## 2022-06-17 PROCEDURE — 82247 BILIRUBIN TOTAL: CPT | Performed by: OBSTETRICS & GYNECOLOGY

## 2022-06-17 PROCEDURE — 96372 THER/PROPH/DIAG INJ SC/IM: CPT

## 2022-06-17 PROCEDURE — 87635 SARS-COV-2 COVID-19 AMP PRB: CPT | Performed by: OBSTETRICS & GYNECOLOGY

## 2022-06-17 PROCEDURE — 85027 COMPLETE CBC AUTOMATED: CPT | Performed by: OBSTETRICS & GYNECOLOGY

## 2022-06-17 RX ORDER — LEVOTHYROXINE SODIUM 0.1 MG/1
100 TABLET ORAL
Status: DISCONTINUED | OUTPATIENT
Start: 2022-06-18 | End: 2022-06-18 | Stop reason: HOSPADM

## 2022-06-17 RX ORDER — LEVOTHYROXINE SODIUM 0.1 MG/1
100 TABLET ORAL
Status: DISCONTINUED | OUTPATIENT
Start: 2022-06-17 | End: 2022-06-17

## 2022-06-17 RX ORDER — BETAMETHASONE SODIUM PHOSPHATE AND BETAMETHASONE ACETATE 3; 3 MG/ML; MG/ML
12 INJECTION, SUSPENSION INTRA-ARTICULAR; INTRALESIONAL; INTRAMUSCULAR; SOFT TISSUE DAILY
Status: COMPLETED | OUTPATIENT
Start: 2022-06-17 | End: 2022-06-18

## 2022-06-17 RX ADMIN — BETAMETHASONE SODIUM PHOSPHATE AND BETAMETHASONE ACETATE 12 MG: 3; 3 INJECTION, SUSPENSION INTRA-ARTICULAR; INTRALESIONAL; INTRAMUSCULAR; SOFT TISSUE at 17:31

## 2022-06-17 NOTE — TELEPHONE ENCOUNTER
Patient is 31 weeks pregnant and is worried about her blood pressure and saying its high, wondering what she could do or if she needs to go 2 the er

## 2022-06-17 NOTE — TELEPHONE ENCOUNTER
Patient states she in concerned about her BP and HR. They have been fine; however, today she drank a BCAA (Branched-Chain Amino Acid) drink around 10:30am. She first nticed an increase in her HR. Checked BP several times. Running 166/118 HR92. Denies persistent HA, vision changes, epigastric pain, SOA, swelling. Baby moving like nml Hx GHTN with previous preg. Pt advised to lie down for 1-2mins and repeat BP. New reading 200/136. Patient advised to come in to be evaluated. Pt KADIE.      normal...

## 2022-06-17 NOTE — PROGRESS NOTES
OB FOLLOW UP    Lorie Lara is a 36 y.o.  30w6d patient being seen today for her obstetrical follow up visit. Patient reports BP issue. Home Bps 195/135, 224/150, 200/136, 161/111, 176/123 all sitting.     Her prenatal care is complicated by (and status) :    Patient Active Problem List   Diagnosis   • Thyroid disorder   • Vitamin D deficiency   • Disorder of thyroid   • Vitamin D deficiency   • History of gestational hypertension   • History of gestational diabetes   • BMI 45.0-49.9, adult (HCC)   • Multigravida of advanced maternal age in first trimester   • 29 weeks gestation of pregnancy   • 30 weeks gestation of pregnancy   • GDM (gestational diabetes mellitus), class A1       ROS -   Patient Reports : BP issue   Patient Denies: Loss of Fluid, Vaginal Spotting, Vision Changes, Headaches and Cramping/Contractions   Fetal Movement : normal      BP (!) 150/105   Wt 112 kg (246 lb 3.2 oz)   LMP 2021 (Exact Date)   BMI 48.08 kg/m²     Ultrasound Today: no    EXAM:  Vitals: See prenatal flowsheet   Abdomen: See prenatal flowsheet   Urine glucose/protein: See prenatal flowsheet   Pelvic: See prenatal flowsheet     Assessment    1. Pregnancy at 30w6d  2. Fetal status reassuring   3. Hx GTN with previous pregnancy in 3rd trimester  4. Gestational DM    Problem List Items Addressed This Visit        Gravid and     30 weeks gestation of pregnancy - Primary    Relevant Orders    POC Glucose, Urine, Qualitative, Dipstick (Completed)    POC Protein, Urine, Qualitative, Dipstick (Completed)          Plan    1. Patient sent to L&D for evaluation/management of BP & Fetus  2. Fetal movement/PTL or Labor precautions  3. Patient is on Prenatal vitamins  4. Reviewed Pre-eclampsia signs/symptoms  5. Follow up as scheduled for return OB appt.      Stella Linn, APRN  2022

## 2022-06-17 NOTE — H&P
Wilmar  Lorie Lara  : 1985  MRN: [id number]  [facility]: [id number]    History and Physical    Subjective   Chief Complaint   Patient presents with   • Elevated Blood Pressure     Lorie Lara is a 36 y.o. year old  with an Estimated Date of Delivery: 22 currently at 30w6d presenting from the office for evaluation of elevated blood pressures.  She was seen for a routine prenatal visit with no complaints.  Specifically denies headache or vision change.  Denies bleeding.  Denies right upper quadrant pain.  Reports good fetal movement.  Denies contractions, leaking or bleeding.    Prenatal care has been with Dr. Driver.  It has been noted for recently diagnosed gestational diabetes based upon a failed 1 hour glucose challenge test.  Glucose tolerance testing has not been done.  She did have cell free DNA testing that was done which was consistent with a low risk, female fetus    Reviewing her prenatal records she does have some elevated systolic and diastolic blood pressures in the mid second trimester raising the question of whether she may have underlying chronic hypertension.  Baseline 24-hour urine has not been done in this pregnancy.    OB History    Para Term  AB Living   2 1 0 1 0 1   SAB IAB Ectopic Molar Multiple Live Births   0 0 0 0 0 1      # Outcome Date GA Lbr Femi/2nd Weight Sex Delivery Anes PTL Lv   2 Current            1  19 36w6d  2781 g (6 lb 2.1 oz) F Vag-Spont EPI Y RONDA      Birth Comments: GDM, Hypertension, Low fluid      Name: Marisel       Obstetric Comments   2019 - Marisel     Past Medical History:   Diagnosis Date   • History of varicella    • Acquired hypothyroidism    • History of gestational diabetes     G1   • History of gestational hypertension 2019    G1   • GDM (gestational diabetes mellitus), class A1 2022     Past Surgical History:   Procedure Laterality Date   • TONSILLECTOMY     • INGUINAL HERNIA  REPAIR Right 2006       Current Facility-Administered Medications:   •  betamethasone acetate-betamethasone sodium phosphate (CELESTONE SOLUSPAN) injection 12 mg, 12 mg, Intramuscular, Daily, Ruddy Rivera MD, 12 mg at 22 1731    No Known Allergies  Social History    Tobacco Use      Smoking status: Former Smoker        Packs/day: 1.00        Years: 12.00        Pack years: 12        Types: Cigarettes        Start date:         Quit date:         Years since quittin.4      Smokeless tobacco: Never Used    Review of Systems      Objective   BP (!) 144/108   Pulse 88   Temp 98.6 °F (37 °C) (Oral)   Resp 16   LMP 2021 (Exact Date)   SpO2 99%   General: well developed; well nourished  no acute distress   Abdomen: soft, non-tender; no masses  no umbilical or inguinal hernias are present  no hepato-splenomegaly   FHT's: reassuring and category 1      Cervix: was not checked.   Contractions: none     Prenatal Labs  Lab Results   Component Value Date    HGB 12.8 2022    RUBELLAABIGG 18.10 2022    HEPBSAG Negative 2022    ABSCRN Negative 2022    IZU5ZCU1 Non Reactive 2022    HEPCVIRUSABY 0.1 2022     (H) 2022    URINECX Final report 2022    CHLAMNAA Negative 2022    NGONORRHON Negative 2022       Current Labs Reviewed   CBC:   Lab Results   Component Value Date    WBC 10.13 2022    HGB 12.8 2022    HCT 37.0 2022     2022     Pre-eclampsia Panel:   Lab Results   Component Value Date    ALKPHOS 79 2022    AST 17 2022    ALT 12 2022    BILITOT 0.2 2022     2022    URICACID 5.0 2022    CREATININE 0.54 (L) 2022          Assessment   1. IUP at 30w6d  2. Elevated blood pressure differential to include chronic hypertension versus chronic hypertension with superimposed preeclampsia versus new onset preeclampsia  3. Failed 1 hour glucose challenge test  with presumed diagnosis of gestational diabetes  4. AMA with normal cfDNA     Plan   1. Admit for evaluation including 24-hour urine  2. Steroids for fetal benefit  3. Fingerstick blood sugars anticipating elevated blood sugars related to steroid effect    Ruddy Rivera MD  6/17/2022  17:41 EDT

## 2022-06-18 VITALS
DIASTOLIC BLOOD PRESSURE: 84 MMHG | HEART RATE: 82 BPM | RESPIRATION RATE: 16 BRPM | TEMPERATURE: 98.2 F | SYSTOLIC BLOOD PRESSURE: 138 MMHG | OXYGEN SATURATION: 97 %

## 2022-06-18 LAB
COLLECT DURATION TIME UR: 24 HRS
GLUCOSE BLDC GLUCOMTR-MCNC: 102 MG/DL (ref 70–130)
GLUCOSE BLDC GLUCOMTR-MCNC: 111 MG/DL (ref 70–130)
GLUCOSE BLDC GLUCOMTR-MCNC: 96 MG/DL (ref 70–130)
PROT 24H UR-MRATE: 344.4 MG/24HOURS (ref 0–150)
SPECIMEN VOL 24H UR: 4200 ML

## 2022-06-18 PROCEDURE — 25010000002 BETAMETHASONE ACET & SOD PHOS PER 4 MG: Performed by: OBSTETRICS & GYNECOLOGY

## 2022-06-18 PROCEDURE — 59025 FETAL NON-STRESS TEST: CPT

## 2022-06-18 PROCEDURE — 82962 GLUCOSE BLOOD TEST: CPT

## 2022-06-18 PROCEDURE — 81050 URINALYSIS VOLUME MEASURE: CPT | Performed by: OBSTETRICS & GYNECOLOGY

## 2022-06-18 PROCEDURE — 99217 PR OBSERVATION CARE DISCHARGE MANAGEMENT: CPT | Performed by: OBSTETRICS & GYNECOLOGY

## 2022-06-18 PROCEDURE — 96372 THER/PROPH/DIAG INJ SC/IM: CPT

## 2022-06-18 PROCEDURE — 59025 FETAL NON-STRESS TEST: CPT | Performed by: OBSTETRICS & GYNECOLOGY

## 2022-06-18 PROCEDURE — G0378 HOSPITAL OBSERVATION PER HR: HCPCS

## 2022-06-18 PROCEDURE — 84156 ASSAY OF PROTEIN URINE: CPT | Performed by: OBSTETRICS & GYNECOLOGY

## 2022-06-18 RX ORDER — ACETAMINOPHEN 500 MG
1000 TABLET ORAL ONCE AS NEEDED
Status: COMPLETED | OUTPATIENT
Start: 2022-06-18 | End: 2022-06-18

## 2022-06-18 RX ADMIN — LEVOTHYROXINE SODIUM 100 MCG: 0.1 TABLET ORAL at 05:03

## 2022-06-18 RX ADMIN — BETAMETHASONE SODIUM PHOSPHATE AND BETAMETHASONE ACETATE 12 MG: 3; 3 INJECTION, SUSPENSION INTRA-ARTICULAR; INTRALESIONAL; INTRAMUSCULAR; SOFT TISSUE at 17:51

## 2022-06-18 RX ADMIN — ACETAMINOPHEN 1000 MG: 500 TABLET ORAL at 12:55

## 2022-06-18 NOTE — PROGRESS NOTES
Wilmar Lara  : 1985  MRN: [id number]  CSN: [id number]    Hospital Day: 2    CC: hospital follow-up for elevated blood pressure    Antepartum Progress Note    Subjective   She has no new complaints.  Denies headache or vision change.  Fetal movement is normal    Review of Systems       Objective     Min/max vitals past 24 hours:   Temp  Min: 97.9 °F (36.6 °C)  Max: 98.6 °F (37 °C)  BP  Min: 130/90  Max: 152/101  Pulse  Min: 78  Max: 96  Resp  Min: 16  Max: 16         General: well developed; well nourished  no acute distress   Heart: Not performed.   Lungs: breathing is unlabored   Abdomen: soft, non-tender; no masses  no umbilical or inguinal hernias are present  no hepato-splenomegaly   FHT's: reassuring and category 1   Cervix: was not checked.   Contractions: none      CBC:   Lab Results   Component Value Date    WBC 10.13 2022    HGB 12.8 2022    HCT 37.0 2022     2022     FSBS's:   Glucose   Date/Time Value Ref Range Status   2022 1026 96 70 - 130 mg/dL Final   2022 0502 102 70 - 130 mg/dL Final   2022 2054 100 70 - 130 mg/dL Final     Pre-eclampsia Panel:   Lab Results   Component Value Date    ALKPHOS 79 2022    AST 17 2022    ALT 12 2022    BILITOT 0.2 2022     2022    URICACID 5.0 2022    CREATININE 0.54 (L) 2022          Assessment   1. IUP at 31w0d  2. Suspected chronic hypertension without superimposed preeclampsia  3. Failed glucose challenge test without glucose tolerance test yet been performed.  Blood sugars are normal during hospitalization even having received steroids     Plan   1. Complete 24-hour urine  2. Finish steroids  3. If stable by this evening okay for discharge on bedrest with plan follow-up next week      Non Stress Test      Estimated Date of Delivery: 22  Gestational Age: 31w0d    Indication for NST  elevated blood pressure       Total Time on NST > 20  minutes       Interpretation    Baseline  beats per minute   Category 1   Decelerations Absent       Additional Comments none         Ruddy Rivera MD  6/18/2022  11:07 EDT

## 2022-06-18 NOTE — DISCHARGE SUMMARY
MARGARETTE Urban   Lorie Lara  : 1985  MRN: [id number]  CSN: [id number]    Discharge Summary    A formal discharge summary was not needed because this admission was for an observation visit.    Discharge Date: 22   Discharge Dx:    1. IUP @ 31w0d  2. Chronic hypertension, rule out superimposed preeclampsia  3. Failed glucose challenge test without glucose tolerance test yet performed  4. Advanced maternal age with normal cell free DNA   Disposition: home   Condition at discharge: stable   Follow up: Early next week with Dr. Driver         This note has been electronically signed.    Ruddy Rivera MD

## 2022-06-20 ENCOUNTER — ROUTINE PRENATAL (OUTPATIENT)
Dept: OBSTETRICS AND GYNECOLOGY | Facility: CLINIC | Age: 37
End: 2022-06-20

## 2022-06-20 VITALS — DIASTOLIC BLOOD PRESSURE: 96 MMHG | BODY MASS INDEX: 47.61 KG/M2 | SYSTOLIC BLOOD PRESSURE: 142 MMHG | WEIGHT: 243.8 LBS

## 2022-06-20 DIAGNOSIS — O99.283 HYPOTHYROIDISM COMPLICATING PREGNANCY, THIRD TRIMESTER: ICD-10-CM

## 2022-06-20 DIAGNOSIS — O09.523 MULTIGRAVIDA OF ADVANCED MATERNAL AGE IN THIRD TRIMESTER: Primary | ICD-10-CM

## 2022-06-20 DIAGNOSIS — Z3A.31 31 WEEKS GESTATION OF PREGNANCY: ICD-10-CM

## 2022-06-20 DIAGNOSIS — O24.410 GDM (GESTATIONAL DIABETES MELLITUS), CLASS A1: ICD-10-CM

## 2022-06-20 DIAGNOSIS — Z3A.30 30 WEEKS GESTATION OF PREGNANCY: ICD-10-CM

## 2022-06-20 DIAGNOSIS — O13.3 GESTATIONAL HYPERTENSION, THIRD TRIMESTER: ICD-10-CM

## 2022-06-20 DIAGNOSIS — E03.9 HYPOTHYROIDISM COMPLICATING PREGNANCY, THIRD TRIMESTER: ICD-10-CM

## 2022-06-20 LAB
GLUCOSE UR STRIP-MCNC: NEGATIVE MG/DL
PROT UR STRIP-MCNC: ABNORMAL MG/DL

## 2022-06-20 PROCEDURE — 0502F SUBSEQUENT PRENATAL CARE: CPT | Performed by: NURSE PRACTITIONER

## 2022-06-20 RX ORDER — GINGER ROOT/GINGER ROOT EXT 262.5 MG
CAPSULE ORAL
COMMUNITY
End: 2022-07-28

## 2022-06-20 RX ORDER — CHLORAL HYDRATE 500 MG
CAPSULE ORAL
COMMUNITY
End: 2022-07-28

## 2022-06-20 NOTE — PROGRESS NOTES
OB FOLLOW UP    Lorie Lara is a 36 y.o.  31w2d patient being seen today for her obstetrical follow up visit. Patient reports no complaints. She was evaluated in  over the weekend for elevated BP's at home. 24 hr urine with + protein 344.4mg/24hr. PEP normal. She was discharged home with instructions to FU with [facility] today. Her BP in office today is 142/96. She states she was put on BP meds late in her last pregnancy.    Her prenatal care is complicated by (and status) :    Patient Active Problem List   Diagnosis   • AMA in third trimester - normal cfDNA   • 30 weeks gestation of pregnancy   • GDM (gestational diabetes mellitus), class A1   • Acquired hypothyroidism   • Gestational hypertension, third trimester - may represent undiagnosed cHTN   • Hypothyroidism complicating pregnancy, third trimester       ROS -   Patient Reports : No Problems  Patient Denies: Loss of Fluid, Vaginal Spotting, Vision Changes, Headaches and Cramping/Contractions   Fetal Movement : normal      /96   Wt 111 kg (243 lb 12.8 oz)   LMP 2021 (Exact Date)   BMI 47.61 kg/m²     Ultrasound Today: no    EXAM:  Vitals: See prenatal flowsheet   Abdomen: See prenatal flowsheet   Urine glucose/protein: See prenatal flowsheet   Pelvic: See prenatal flowsheet     Assessment    Diagnoses and all orders for this visit:    1. Multigravida of advanced maternal age in third trimester (Primary)    2. Gestational hypertension, third trimester    3. 30 weeks gestation of pregnancy    4. GDM (gestational diabetes mellitus), class A1    5. Hypothyroidism complicating pregnancy, third trimester    6. 31 weeks gestation of pregnancy  -     POC Urinalysis Dipstick        1. Pregnancy at 31w2d  2. Fetal status reassuring     Problem List Items Addressed This Visit        Endocrine and Metabolic    GDM (gestational diabetes mellitus), class A1    Overview     NST starting at 36wk              Gravid and     AMA in third  trimester - normal cfDNA - Primary    30 weeks gestation of pregnancy    Gestational hypertension, third trimester - may represent undiagnosed cHTN    Overview     22 - 24 hour urine = 344 mg/dL           Hypothyroidism complicating pregnancy, third trimester    Relevant Medications    levothyroxine (SYNTHROID, LEVOTHROID) 100 MCG tablet      Other Visit Diagnoses     31 weeks gestation of pregnancy        Relevant Orders    POC Urinalysis Dipstick (Completed)          Plan    1. Fetal movement/ Labor Precautions reviewed  2. Monitor BP at home. Preeclampsia s/s reviewed. Call for BP's >150/100  3. Bi-weekly NST's per LISY.  4. Does not need to be on bedrest but should not do any strenuous activities.  5. Patient is on Prenatal vitamins  6. Activity recommendation : 150 minutes/week of moderate intensity aerobic activity unless we limit for bleeding, hypertension or other pregnancy complication    7. FU Thursday for NST and repeat labs.        Mukul Snigh, APRN  2022

## 2022-06-22 ENCOUNTER — TELEPHONE (OUTPATIENT)
Dept: OBSTETRICS AND GYNECOLOGY | Facility: CLINIC | Age: 37
End: 2022-06-22

## 2022-06-22 ENCOUNTER — HOSPITAL ENCOUNTER (OUTPATIENT)
Facility: HOSPITAL | Age: 37
Discharge: HOME | End: 2022-06-27
Attending: OBSTETRICS & GYNECOLOGY | Admitting: OBSTETRICS & GYNECOLOGY

## 2022-06-22 PROBLEM — I10 HTN (HYPERTENSION): Status: ACTIVE | Noted: 2022-06-22

## 2022-06-22 LAB
ALP SERPL-CCNC: 82 U/L (ref 39–117)
ALT SERPL W P-5'-P-CCNC: 12 U/L (ref 1–33)
AST SERPL-CCNC: 16 U/L (ref 1–32)
BILIRUB SERPL-MCNC: 0.2 MG/DL (ref 0–1.2)
CREAT SERPL-MCNC: 0.63 MG/DL (ref 0.57–1)
DEPRECATED RDW RBC AUTO: 43.2 FL (ref 37–54)
ERYTHROCYTE [DISTWIDTH] IN BLOOD BY AUTOMATED COUNT: 13.4 % (ref 12.3–15.4)
EXPIRATION DATE: NORMAL
GLUCOSE BLDC GLUCOMTR-MCNC: 102 MG/DL (ref 70–130)
HCT VFR BLD AUTO: 38 % (ref 34–46.6)
HGB BLD-MCNC: 13.4 G/DL (ref 12–15.9)
LDH SERPL-CCNC: 192 U/L (ref 135–214)
Lab: 1004
MCH RBC QN AUTO: 31.2 PG (ref 26.6–33)
MCHC RBC AUTO-ENTMCNC: 35.3 G/DL (ref 31.5–35.7)
MCV RBC AUTO: 88.6 FL (ref 79–97)
PLATELET # BLD AUTO: 198 10*3/MM3 (ref 140–450)
PMV BLD AUTO: 9.4 FL (ref 6–12)
PROT UR STRIP-MCNC: NEGATIVE MG/DL
RBC # BLD AUTO: 4.29 10*6/MM3 (ref 3.77–5.28)
SARS-COV-2 RDRP RESP QL NAA+PROBE: NORMAL
URATE SERPL-MCNC: 3.8 MG/DL (ref 2.4–5.7)
WBC NRBC COR # BLD: 11.82 10*3/MM3 (ref 3.4–10.8)

## 2022-06-22 PROCEDURE — 81002 URINALYSIS NONAUTO W/O SCOPE: CPT | Performed by: OBSTETRICS & GYNECOLOGY

## 2022-06-22 PROCEDURE — 59025 FETAL NON-STRESS TEST: CPT

## 2022-06-22 PROCEDURE — G0378 HOSPITAL OBSERVATION PER HR: HCPCS

## 2022-06-22 PROCEDURE — 36415 COLL VENOUS BLD VENIPUNCTURE: CPT | Performed by: OBSTETRICS & GYNECOLOGY

## 2022-06-22 PROCEDURE — 87635 SARS-COV-2 COVID-19 AMP PRB: CPT | Performed by: OBSTETRICS & GYNECOLOGY

## 2022-06-22 PROCEDURE — 82565 ASSAY OF CREATININE: CPT | Performed by: OBSTETRICS & GYNECOLOGY

## 2022-06-22 PROCEDURE — 84460 ALANINE AMINO (ALT) (SGPT): CPT | Performed by: OBSTETRICS & GYNECOLOGY

## 2022-06-22 PROCEDURE — 82962 GLUCOSE BLOOD TEST: CPT

## 2022-06-22 PROCEDURE — 84450 TRANSFERASE (AST) (SGOT): CPT | Performed by: OBSTETRICS & GYNECOLOGY

## 2022-06-22 PROCEDURE — 85027 COMPLETE CBC AUTOMATED: CPT | Performed by: OBSTETRICS & GYNECOLOGY

## 2022-06-22 PROCEDURE — 84075 ASSAY ALKALINE PHOSPHATASE: CPT | Performed by: OBSTETRICS & GYNECOLOGY

## 2022-06-22 PROCEDURE — G0463 HOSPITAL OUTPT CLINIC VISIT: HCPCS

## 2022-06-22 PROCEDURE — 59025 FETAL NON-STRESS TEST: CPT | Performed by: OBSTETRICS & GYNECOLOGY

## 2022-06-22 PROCEDURE — 99218 PR INITIAL OBSERVATION CARE/DAY 30 MINUTES: CPT | Performed by: OBSTETRICS & GYNECOLOGY

## 2022-06-22 PROCEDURE — 84550 ASSAY OF BLOOD/URIC ACID: CPT | Performed by: OBSTETRICS & GYNECOLOGY

## 2022-06-22 PROCEDURE — 82247 BILIRUBIN TOTAL: CPT | Performed by: OBSTETRICS & GYNECOLOGY

## 2022-06-22 PROCEDURE — 83615 LACTATE (LD) (LDH) ENZYME: CPT | Performed by: OBSTETRICS & GYNECOLOGY

## 2022-06-22 RX ORDER — ASPIRIN 81 MG/1
81 TABLET, CHEWABLE ORAL DAILY
Status: DISCONTINUED | OUTPATIENT
Start: 2022-06-22 | End: 2022-06-27 | Stop reason: HOSPADM

## 2022-06-22 RX ORDER — LEVOTHYROXINE SODIUM 0.1 MG/1
100 TABLET ORAL DAILY
Status: DISCONTINUED | OUTPATIENT
Start: 2022-06-23 | End: 2022-06-27 | Stop reason: HOSPADM

## 2022-06-22 RX ORDER — NIFEDIPINE 30 MG/1
30 TABLET, EXTENDED RELEASE ORAL EVERY 12 HOURS SCHEDULED
Status: DISCONTINUED | OUTPATIENT
Start: 2022-06-22 | End: 2022-06-27 | Stop reason: HOSPADM

## 2022-06-22 RX ADMIN — ASPIRIN 81 MG CHEWABLE TABLET 81 MG: 81 TABLET CHEWABLE at 21:30

## 2022-06-22 RX ADMIN — NIFEDIPINE 30 MG: 30 TABLET, FILM COATED, EXTENDED RELEASE ORAL at 21:30

## 2022-06-22 NOTE — TELEPHONE ENCOUNTER
----- Message from Lorie Lara sent at 6/22/2022  4:11 PM EDT -----  Regarding: Elevated BP  Hello -    I just took my BP at home because I could tell my HR was elevated. At last visit was instructed to call the office if it was higher than 150/100. When checked w/ home cuff I had a reading on L arm w/ 177/122 and then again on R and was 150/115.     Should I go to the ER? Or come to ? Please advise. Thank you.    Best,

## 2022-06-23 ENCOUNTER — APPOINTMENT (OUTPATIENT)
Dept: WOMENS IMAGING | Facility: HOSPITAL | Age: 37
End: 2022-06-23

## 2022-06-23 LAB
GLUCOSE BLDC GLUCOMTR-MCNC: 112 MG/DL (ref 70–130)
GLUCOSE BLDC GLUCOMTR-MCNC: 118 MG/DL (ref 70–130)
GLUCOSE BLDC GLUCOMTR-MCNC: 118 MG/DL (ref 70–130)
GLUCOSE BLDC GLUCOMTR-MCNC: 97 MG/DL (ref 70–130)

## 2022-06-23 PROCEDURE — 99224 PR SBSQ OBSERVATION CARE/DAY 15 MINUTES: CPT | Performed by: OBSTETRICS & GYNECOLOGY

## 2022-06-23 PROCEDURE — G0378 HOSPITAL OBSERVATION PER HR: HCPCS

## 2022-06-23 PROCEDURE — 76819 FETAL BIOPHYS PROFIL W/O NST: CPT

## 2022-06-23 PROCEDURE — 59025 FETAL NON-STRESS TEST: CPT

## 2022-06-23 PROCEDURE — 76811 OB US DETAILED SNGL FETUS: CPT | Performed by: OBSTETRICS & GYNECOLOGY

## 2022-06-23 PROCEDURE — 76819 FETAL BIOPHYS PROFIL W/O NST: CPT | Performed by: OBSTETRICS & GYNECOLOGY

## 2022-06-23 PROCEDURE — 76820 UMBILICAL ARTERY ECHO: CPT | Performed by: OBSTETRICS & GYNECOLOGY

## 2022-06-23 PROCEDURE — 76811 OB US DETAILED SNGL FETUS: CPT

## 2022-06-23 PROCEDURE — 76820 UMBILICAL ARTERY ECHO: CPT

## 2022-06-23 PROCEDURE — 82962 GLUCOSE BLOOD TEST: CPT

## 2022-06-23 RX ORDER — ACETAMINOPHEN 500 MG
1000 TABLET ORAL ONCE
Status: COMPLETED | OUTPATIENT
Start: 2022-06-23 | End: 2022-06-23

## 2022-06-23 RX ADMIN — LEVOTHYROXINE SODIUM 100 MCG: 0.1 TABLET ORAL at 06:43

## 2022-06-23 RX ADMIN — NIFEDIPINE 30 MG: 30 TABLET, FILM COATED, EXTENDED RELEASE ORAL at 08:14

## 2022-06-23 RX ADMIN — ASPIRIN 81 MG CHEWABLE TABLET 81 MG: 81 TABLET CHEWABLE at 21:45

## 2022-06-23 RX ADMIN — NIFEDIPINE 30 MG: 30 TABLET, FILM COATED, EXTENDED RELEASE ORAL at 21:45

## 2022-06-23 RX ADMIN — ACETAMINOPHEN 1000 MG: 500 TABLET ORAL at 16:41

## 2022-06-24 LAB
ALP SERPL-CCNC: 78 U/L (ref 39–117)
ALT SERPL W P-5'-P-CCNC: 12 U/L (ref 1–33)
AST SERPL-CCNC: 14 U/L (ref 1–32)
BILIRUB SERPL-MCNC: 0.2 MG/DL (ref 0–1.2)
CREAT SERPL-MCNC: 0.46 MG/DL (ref 0.57–1)
DEPRECATED RDW RBC AUTO: 44.8 FL (ref 37–54)
ERYTHROCYTE [DISTWIDTH] IN BLOOD BY AUTOMATED COUNT: 13.4 % (ref 12.3–15.4)
EXPIRATION DATE: 0
GLUCOSE BLDC GLUCOMTR-MCNC: 106 MG/DL (ref 70–130)
GLUCOSE BLDC GLUCOMTR-MCNC: 114 MG/DL (ref 70–130)
GLUCOSE BLDC GLUCOMTR-MCNC: 115 MG/DL (ref 70–130)
GLUCOSE BLDC GLUCOMTR-MCNC: 161 MG/DL (ref 70–130)
GLUCOSE BLDC GLUCOMTR-MCNC: 87 MG/DL (ref 70–130)
GLUCOSE BLDC GLUCOMTR-MCNC: 94 MG/DL (ref 70–130)
HCT VFR BLD AUTO: 40.9 % (ref 34–46.6)
HGB BLD-MCNC: 13.8 G/DL (ref 12–15.9)
LDH SERPL-CCNC: 150 U/L (ref 135–214)
Lab: 0
MCH RBC QN AUTO: 30.7 PG (ref 26.6–33)
MCHC RBC AUTO-ENTMCNC: 33.7 G/DL (ref 31.5–35.7)
MCV RBC AUTO: 91.1 FL (ref 79–97)
PLATELET # BLD AUTO: 179 10*3/MM3 (ref 140–450)
PMV BLD AUTO: 9.4 FL (ref 6–12)
PROT UR STRIP-MCNC: NEGATIVE MG/DL
RBC # BLD AUTO: 4.49 10*6/MM3 (ref 3.77–5.28)
URATE SERPL-MCNC: 3.6 MG/DL (ref 2.4–5.7)
WBC NRBC COR # BLD: 9.8 10*3/MM3 (ref 3.4–10.8)

## 2022-06-24 PROCEDURE — 85027 COMPLETE CBC AUTOMATED: CPT | Performed by: OBSTETRICS & GYNECOLOGY

## 2022-06-24 PROCEDURE — 84450 TRANSFERASE (AST) (SGOT): CPT | Performed by: OBSTETRICS & GYNECOLOGY

## 2022-06-24 PROCEDURE — 99231 SBSQ HOSP IP/OBS SF/LOW 25: CPT | Performed by: OBSTETRICS & GYNECOLOGY

## 2022-06-24 PROCEDURE — 82565 ASSAY OF CREATININE: CPT | Performed by: OBSTETRICS & GYNECOLOGY

## 2022-06-24 PROCEDURE — 82962 GLUCOSE BLOOD TEST: CPT

## 2022-06-24 PROCEDURE — 84075 ASSAY ALKALINE PHOSPHATASE: CPT | Performed by: OBSTETRICS & GYNECOLOGY

## 2022-06-24 PROCEDURE — 59025 FETAL NON-STRESS TEST: CPT

## 2022-06-24 PROCEDURE — 84550 ASSAY OF BLOOD/URIC ACID: CPT | Performed by: OBSTETRICS & GYNECOLOGY

## 2022-06-24 PROCEDURE — 81002 URINALYSIS NONAUTO W/O SCOPE: CPT | Performed by: OBSTETRICS & GYNECOLOGY

## 2022-06-24 PROCEDURE — 82247 BILIRUBIN TOTAL: CPT | Performed by: OBSTETRICS & GYNECOLOGY

## 2022-06-24 PROCEDURE — 83615 LACTATE (LD) (LDH) ENZYME: CPT | Performed by: OBSTETRICS & GYNECOLOGY

## 2022-06-24 PROCEDURE — 84460 ALANINE AMINO (ALT) (SGPT): CPT | Performed by: OBSTETRICS & GYNECOLOGY

## 2022-06-24 RX ADMIN — LEVOTHYROXINE SODIUM 100 MCG: 0.1 TABLET ORAL at 06:42

## 2022-06-24 RX ADMIN — ASPIRIN 81 MG CHEWABLE TABLET 81 MG: 81 TABLET CHEWABLE at 20:47

## 2022-06-24 RX ADMIN — NIFEDIPINE 30 MG: 30 TABLET, FILM COATED, EXTENDED RELEASE ORAL at 20:47

## 2022-06-24 RX ADMIN — NIFEDIPINE 30 MG: 30 TABLET, FILM COATED, EXTENDED RELEASE ORAL at 08:40

## 2022-06-25 LAB
COLLECT DURATION TIME UR: 24 HRS
COLLECT DURATION TIME UR: 24 HRS
CREAT UR-MCNC: 32.7 MG/DL
CREATINE 24H UR-MRATE: 2.06 G/24 HR (ref 0.7–1.6)
GLUCOSE BLDC GLUCOMTR-MCNC: 111 MG/DL (ref 70–130)
GLUCOSE BLDC GLUCOMTR-MCNC: 125 MG/DL (ref 70–130)
GLUCOSE BLDC GLUCOMTR-MCNC: 185 MG/DL (ref 70–130)
GLUCOSE BLDC GLUCOMTR-MCNC: 93 MG/DL (ref 70–130)
PROT 24H UR-MRATE: 333.9 MG/24HOURS (ref 0–150)
SPECIMEN VOL 24H UR: 6300 ML
SPECIMEN VOL 24H UR: 6300 ML

## 2022-06-25 PROCEDURE — 84156 ASSAY OF PROTEIN URINE: CPT | Performed by: OBSTETRICS & GYNECOLOGY

## 2022-06-25 PROCEDURE — 59025 FETAL NON-STRESS TEST: CPT

## 2022-06-25 PROCEDURE — 82570 ASSAY OF URINE CREATININE: CPT | Performed by: OBSTETRICS & GYNECOLOGY

## 2022-06-25 PROCEDURE — 59025 FETAL NON-STRESS TEST: CPT | Performed by: OBSTETRICS & GYNECOLOGY

## 2022-06-25 PROCEDURE — 82962 GLUCOSE BLOOD TEST: CPT

## 2022-06-25 PROCEDURE — 81050 URINALYSIS VOLUME MEASURE: CPT | Performed by: OBSTETRICS & GYNECOLOGY

## 2022-06-25 PROCEDURE — 99231 SBSQ HOSP IP/OBS SF/LOW 25: CPT | Performed by: OBSTETRICS & GYNECOLOGY

## 2022-06-25 RX ADMIN — ASPIRIN 81 MG CHEWABLE TABLET 81 MG: 81 TABLET CHEWABLE at 21:04

## 2022-06-25 RX ADMIN — NIFEDIPINE 30 MG: 30 TABLET, FILM COATED, EXTENDED RELEASE ORAL at 09:36

## 2022-06-25 RX ADMIN — LEVOTHYROXINE SODIUM 100 MCG: 0.1 TABLET ORAL at 06:34

## 2022-06-25 RX ADMIN — NIFEDIPINE 30 MG: 30 TABLET, FILM COATED, EXTENDED RELEASE ORAL at 21:04

## 2022-06-26 LAB
ALP SERPL-CCNC: 77 U/L (ref 39–117)
ALT SERPL W P-5'-P-CCNC: 12 U/L (ref 1–33)
AST SERPL-CCNC: 13 U/L (ref 1–32)
BASOPHILS # BLD AUTO: 0.01 10*3/MM3 (ref 0–0.2)
BASOPHILS NFR BLD AUTO: 0.1 % (ref 0–1.5)
BILIRUB SERPL-MCNC: 0.2 MG/DL (ref 0–1.2)
CREAT SERPL-MCNC: 0.63 MG/DL (ref 0.57–1)
DEPRECATED RDW RBC AUTO: 43.3 FL (ref 37–54)
EOSINOPHIL # BLD AUTO: 0.07 10*3/MM3 (ref 0–0.4)
EOSINOPHIL NFR BLD AUTO: 0.8 % (ref 0.3–6.2)
ERYTHROCYTE [DISTWIDTH] IN BLOOD BY AUTOMATED COUNT: 13.2 % (ref 12.3–15.4)
GLUCOSE BLDC GLUCOMTR-MCNC: 102 MG/DL (ref 70–130)
GLUCOSE BLDC GLUCOMTR-MCNC: 117 MG/DL (ref 70–130)
GLUCOSE BLDC GLUCOMTR-MCNC: 119 MG/DL (ref 70–130)
GLUCOSE BLDC GLUCOMTR-MCNC: 98 MG/DL (ref 70–130)
HCT VFR BLD AUTO: 39.2 % (ref 34–46.6)
HGB BLD-MCNC: 13.3 G/DL (ref 12–15.9)
IMM GRANULOCYTES # BLD AUTO: 0.06 10*3/MM3 (ref 0–0.05)
IMM GRANULOCYTES NFR BLD AUTO: 0.7 % (ref 0–0.5)
LDH SERPL-CCNC: 142 U/L (ref 135–214)
LYMPHOCYTES # BLD AUTO: 1.55 10*3/MM3 (ref 0.7–3.1)
LYMPHOCYTES NFR BLD AUTO: 17.9 % (ref 19.6–45.3)
MCH RBC QN AUTO: 30.5 PG (ref 26.6–33)
MCHC RBC AUTO-ENTMCNC: 33.9 G/DL (ref 31.5–35.7)
MCV RBC AUTO: 89.9 FL (ref 79–97)
MONOCYTES # BLD AUTO: 0.68 10*3/MM3 (ref 0.1–0.9)
MONOCYTES NFR BLD AUTO: 7.9 % (ref 5–12)
NEUTROPHILS NFR BLD AUTO: 6.29 10*3/MM3 (ref 1.7–7)
NEUTROPHILS NFR BLD AUTO: 72.6 % (ref 42.7–76)
NRBC BLD AUTO-RTO: 0 /100 WBC (ref 0–0.2)
PLATELET # BLD AUTO: 166 10*3/MM3 (ref 140–450)
PMV BLD AUTO: 9.6 FL (ref 6–12)
RBC # BLD AUTO: 4.36 10*6/MM3 (ref 3.77–5.28)
URATE SERPL-MCNC: 4.2 MG/DL (ref 2.4–5.7)
WBC NRBC COR # BLD: 8.66 10*3/MM3 (ref 3.4–10.8)

## 2022-06-26 PROCEDURE — 59025 FETAL NON-STRESS TEST: CPT

## 2022-06-26 PROCEDURE — 84550 ASSAY OF BLOOD/URIC ACID: CPT | Performed by: OBSTETRICS & GYNECOLOGY

## 2022-06-26 PROCEDURE — 84460 ALANINE AMINO (ALT) (SGPT): CPT | Performed by: OBSTETRICS & GYNECOLOGY

## 2022-06-26 PROCEDURE — 99231 SBSQ HOSP IP/OBS SF/LOW 25: CPT | Performed by: OBSTETRICS & GYNECOLOGY

## 2022-06-26 PROCEDURE — 83615 LACTATE (LD) (LDH) ENZYME: CPT | Performed by: OBSTETRICS & GYNECOLOGY

## 2022-06-26 PROCEDURE — 84075 ASSAY ALKALINE PHOSPHATASE: CPT | Performed by: OBSTETRICS & GYNECOLOGY

## 2022-06-26 PROCEDURE — 84450 TRANSFERASE (AST) (SGOT): CPT | Performed by: OBSTETRICS & GYNECOLOGY

## 2022-06-26 PROCEDURE — 85025 COMPLETE CBC W/AUTO DIFF WBC: CPT | Performed by: OBSTETRICS & GYNECOLOGY

## 2022-06-26 PROCEDURE — 82247 BILIRUBIN TOTAL: CPT | Performed by: OBSTETRICS & GYNECOLOGY

## 2022-06-26 PROCEDURE — 59025 FETAL NON-STRESS TEST: CPT | Performed by: OBSTETRICS & GYNECOLOGY

## 2022-06-26 PROCEDURE — 82962 GLUCOSE BLOOD TEST: CPT

## 2022-06-26 PROCEDURE — 82565 ASSAY OF CREATININE: CPT | Performed by: OBSTETRICS & GYNECOLOGY

## 2022-06-26 RX ADMIN — NIFEDIPINE 30 MG: 30 TABLET, FILM COATED, EXTENDED RELEASE ORAL at 07:50

## 2022-06-26 RX ADMIN — ASPIRIN 81 MG CHEWABLE TABLET 81 MG: 81 TABLET CHEWABLE at 21:00

## 2022-06-26 RX ADMIN — NIFEDIPINE 30 MG: 30 TABLET, FILM COATED, EXTENDED RELEASE ORAL at 21:00

## 2022-06-26 RX ADMIN — LEVOTHYROXINE SODIUM 100 MCG: 0.1 TABLET ORAL at 05:56

## 2022-06-27 ENCOUNTER — APPOINTMENT (OUTPATIENT)
Dept: WOMENS IMAGING | Facility: HOSPITAL | Age: 37
End: 2022-06-27

## 2022-06-27 VITALS
WEIGHT: 243 LBS | BODY MASS INDEX: 47.71 KG/M2 | HEART RATE: 89 BPM | RESPIRATION RATE: 16 BRPM | SYSTOLIC BLOOD PRESSURE: 128 MMHG | HEIGHT: 60 IN | DIASTOLIC BLOOD PRESSURE: 81 MMHG | TEMPERATURE: 98.4 F

## 2022-06-27 LAB
GLUCOSE BLDC GLUCOMTR-MCNC: 102 MG/DL (ref 70–130)
GLUCOSE BLDC GLUCOMTR-MCNC: 116 MG/DL (ref 70–130)

## 2022-06-27 PROCEDURE — 59025 FETAL NON-STRESS TEST: CPT

## 2022-06-27 PROCEDURE — 76820 UMBILICAL ARTERY ECHO: CPT

## 2022-06-27 PROCEDURE — 82962 GLUCOSE BLOOD TEST: CPT

## 2022-06-27 PROCEDURE — 76819 FETAL BIOPHYS PROFIL W/O NST: CPT | Performed by: OBSTETRICS & GYNECOLOGY

## 2022-06-27 PROCEDURE — 76819 FETAL BIOPHYS PROFIL W/O NST: CPT

## 2022-06-27 PROCEDURE — 76820 UMBILICAL ARTERY ECHO: CPT | Performed by: OBSTETRICS & GYNECOLOGY

## 2022-06-27 RX ORDER — ASPIRIN 81 MG/1
81 TABLET, CHEWABLE ORAL DAILY
Qty: 30 TABLET | Refills: 1 | Status: SHIPPED | OUTPATIENT
Start: 2022-06-27 | End: 2022-07-28

## 2022-06-27 RX ORDER — NIFEDIPINE 30 MG/1
30 TABLET, FILM COATED, EXTENDED RELEASE ORAL EVERY 12 HOURS SCHEDULED
Qty: 60 TABLET | Refills: 1 | Status: SHIPPED | OUTPATIENT
Start: 2022-06-27 | End: 2022-07-28

## 2022-06-27 RX ADMIN — NIFEDIPINE 30 MG: 30 TABLET, FILM COATED, EXTENDED RELEASE ORAL at 08:20

## 2022-06-27 RX ADMIN — LEVOTHYROXINE SODIUM 100 MCG: 0.1 TABLET ORAL at 06:05

## 2022-06-28 DIAGNOSIS — O11.9 CHRONIC HYPERTENSION WITH SUPERIMPOSED PRE-ECLAMPSIA: Primary | ICD-10-CM

## 2022-07-01 ENCOUNTER — HOSPITAL ENCOUNTER (OUTPATIENT)
Dept: WOMENS IMAGING | Facility: HOSPITAL | Age: 37
Discharge: HOME | End: 2022-07-01
Admitting: OBSTETRICS & GYNECOLOGY

## 2022-07-01 ENCOUNTER — OFFICE VISIT (OUTPATIENT)
Dept: OBSTETRICS AND GYNECOLOGY | Facility: HOSPITAL | Age: 37
End: 2022-07-01

## 2022-07-01 ENCOUNTER — HOSPITAL ENCOUNTER (OUTPATIENT)
Facility: HOSPITAL | Age: 37
Discharge: HOME | End: 2022-07-01
Attending: OBSTETRICS & GYNECOLOGY | Admitting: OBSTETRICS & GYNECOLOGY

## 2022-07-01 VITALS
HEART RATE: 90 BPM | DIASTOLIC BLOOD PRESSURE: 85 MMHG | SYSTOLIC BLOOD PRESSURE: 127 MMHG | TEMPERATURE: 98.6 F | HEIGHT: 60 IN | RESPIRATION RATE: 16 BRPM | WEIGHT: 243 LBS | BODY MASS INDEX: 47.71 KG/M2

## 2022-07-01 VITALS — WEIGHT: 243 LBS | DIASTOLIC BLOOD PRESSURE: 101 MMHG | BODY MASS INDEX: 47.46 KG/M2 | SYSTOLIC BLOOD PRESSURE: 146 MMHG

## 2022-07-01 DIAGNOSIS — O99.283 HYPOTHYROIDISM COMPLICATING PREGNANCY, THIRD TRIMESTER: ICD-10-CM

## 2022-07-01 DIAGNOSIS — O09.523 MULTIGRAVIDA OF ADVANCED MATERNAL AGE IN THIRD TRIMESTER: ICD-10-CM

## 2022-07-01 DIAGNOSIS — E03.9 HYPOTHYROIDISM COMPLICATING PREGNANCY, THIRD TRIMESTER: ICD-10-CM

## 2022-07-01 DIAGNOSIS — O11.9 CHRONIC HYPERTENSION WITH SUPERIMPOSED PRE-ECLAMPSIA: ICD-10-CM

## 2022-07-01 DIAGNOSIS — O13.3 GESTATIONAL HYPERTENSION, THIRD TRIMESTER: Primary | ICD-10-CM

## 2022-07-01 DIAGNOSIS — O24.410 GDM (GESTATIONAL DIABETES MELLITUS), CLASS A1: ICD-10-CM

## 2022-07-01 PROBLEM — Z34.90 PREGNANCY: Status: ACTIVE | Noted: 2022-07-01

## 2022-07-01 LAB
ALP SERPL-CCNC: 91 U/L (ref 39–117)
ALT SERPL W P-5'-P-CCNC: 12 U/L (ref 1–33)
AST SERPL-CCNC: 13 U/L (ref 1–32)
BILIRUB BLD-MCNC: NEGATIVE MG/DL
BILIRUB SERPL-MCNC: 0.2 MG/DL (ref 0–1.2)
CLARITY, POC: CLEAR
COLOR UR: YELLOW
CREAT SERPL-MCNC: 0.64 MG/DL (ref 0.57–1)
DEPRECATED RDW RBC AUTO: 42.6 FL (ref 37–54)
ERYTHROCYTE [DISTWIDTH] IN BLOOD BY AUTOMATED COUNT: 13.2 % (ref 12.3–15.4)
GLUCOSE UR STRIP-MCNC: NEGATIVE MG/DL
HCT VFR BLD AUTO: 39.1 % (ref 34–46.6)
HGB BLD-MCNC: 13.7 G/DL (ref 12–15.9)
KETONES UR QL: NEGATIVE
LDH SERPL-CCNC: 165 U/L (ref 135–214)
LEUKOCYTE EST, POC: NEGATIVE
MCH RBC QN AUTO: 30.9 PG (ref 26.6–33)
MCHC RBC AUTO-ENTMCNC: 35 G/DL (ref 31.5–35.7)
MCV RBC AUTO: 88.3 FL (ref 79–97)
NITRITE UR-MCNC: NEGATIVE MG/ML
PH UR: 6 [PH] (ref 5–8)
PLATELET # BLD AUTO: 166 10*3/MM3 (ref 140–450)
PMV BLD AUTO: 9.8 FL (ref 6–12)
PROT UR STRIP-MCNC: NEGATIVE MG/DL
RBC # BLD AUTO: 4.43 10*6/MM3 (ref 3.77–5.28)
RBC # UR STRIP: NEGATIVE /UL
SP GR UR: 1.01 (ref 1–1.03)
URATE SERPL-MCNC: 4 MG/DL (ref 2.4–5.7)
UROBILINOGEN UR QL: NORMAL
WBC NRBC COR # BLD: 9.55 10*3/MM3 (ref 3.4–10.8)

## 2022-07-01 PROCEDURE — 76819 FETAL BIOPHYS PROFIL W/O NST: CPT

## 2022-07-01 PROCEDURE — 83615 LACTATE (LD) (LDH) ENZYME: CPT | Performed by: OBSTETRICS & GYNECOLOGY

## 2022-07-01 PROCEDURE — G0463 HOSPITAL OUTPT CLINIC VISIT: HCPCS

## 2022-07-01 PROCEDURE — 59025 FETAL NON-STRESS TEST: CPT

## 2022-07-01 PROCEDURE — 84450 TRANSFERASE (AST) (SGOT): CPT | Performed by: OBSTETRICS & GYNECOLOGY

## 2022-07-01 PROCEDURE — 85027 COMPLETE CBC AUTOMATED: CPT | Performed by: OBSTETRICS & GYNECOLOGY

## 2022-07-01 PROCEDURE — 82247 BILIRUBIN TOTAL: CPT | Performed by: OBSTETRICS & GYNECOLOGY

## 2022-07-01 PROCEDURE — 84075 ASSAY ALKALINE PHOSPHATASE: CPT | Performed by: OBSTETRICS & GYNECOLOGY

## 2022-07-01 PROCEDURE — 99218 PR INITIAL OBSERVATION CARE/DAY 30 MINUTES: CPT | Performed by: OBSTETRICS & GYNECOLOGY

## 2022-07-01 PROCEDURE — 82565 ASSAY OF CREATININE: CPT | Performed by: OBSTETRICS & GYNECOLOGY

## 2022-07-01 PROCEDURE — 84550 ASSAY OF BLOOD/URIC ACID: CPT | Performed by: OBSTETRICS & GYNECOLOGY

## 2022-07-01 PROCEDURE — 59025 FETAL NON-STRESS TEST: CPT | Performed by: OBSTETRICS & GYNECOLOGY

## 2022-07-01 PROCEDURE — 76819 FETAL BIOPHYS PROFIL W/O NST: CPT | Performed by: OBSTETRICS & GYNECOLOGY

## 2022-07-01 PROCEDURE — 84460 ALANINE AMINO (ALT) (SGPT): CPT | Performed by: OBSTETRICS & GYNECOLOGY

## 2022-07-01 NOTE — H&P
[place]  Obstetric History and Physical    Referring Provider: Evy Driver      Chief Complaint   Patient presents with   • Elevated Blood Pressure       Subjective     Patient is a 36 y.o. female  currently at 32w6d, who presents from [facility] after fetal evaluation.  For fetal monitoring and blood pressure assessment.  Ms. Callejas's blood pressure noted be elevated..  Patient is well-known to me.  Patient denies any current complaints reports normal fetal activity.  [facility] ultrasound today was reassuring.  Patient was monitored fetal tracing was reactive with no regular contractions.  Serial blood pressures noted to be normal to mild range.  Preeclampsia panel normal limits platelet count 166,000.  Maternal and fetal wellbeing established no evidence of severe disease.        The following portions of the patients history were reviewed and updated as appropriate: current medications, allergies, past medical history, past surgical history, past family history, past social history and problem list .       Prenatal Information:   Maternal Prenatal Labs  Blood Type No results found for: ABO   Rh Status No results found for: RH   Antibody Screen No results found for: ABSCRN   Gonnorhea No results found for: GCCX   Chlamydia No results found for: CLAMYDCU   RPR No results found for: RPR   Syphilis Antibody No results found for: SYPHILIS   Rubella No results found for: RUBELLAIGGIN   Hepatitis B Surface Antigen No results found for: HEPBSAG   HIV-1 Antibody No results found for: LABHIV1   Hepatitis C Antibody No results found for: HEPCAB   Rapid Urin Drug Screen No results found for: AMPMETHU, BARBITSCNUR, LABBENZSCN, LABMETHSCN, LABOPIASCN, THCURSCR, COCAINEUR, AMPHETSCREEN, PROPOXSCN, BUPRENORSCNU, METAMPSCNUR, OXYCODONESCN, TRICYCLICSCN   Group B Strep Culture No results found for: GBSANTIGEN           External Prenatal Results     Pregnancy Outside Results - Transcribed From Office Records - See Scanned  Records For Details     Test Value Date Time    ABO  O  01/11/22     Rh  Positive  01/11/22     Antibody Screen  Negative  06/06/22 1524       Negative  01/11/22     Varicella IgG       Rubella  18.10 index 01/11/22     Hgb  13.7 g/dL 07/01/22 1159       13.3 g/dL 06/26/22 0940       13.8 g/dL 06/24/22 1051       13.4 g/dL 06/22/22 1842       12.8 g/dL 06/17/22 1648       12.7 g/dL 06/06/22 1600       12.6 g/dL 06/06/22 1524       15.0 g/dL 03/04/22 1740       14.1 g/dL 01/11/22     Hct  39.1 % 07/01/22 1159       39.2 % 06/26/22 0940       40.9 % 06/24/22 1051       38.0 % 06/22/22 1842       37.0 % 06/17/22 1648       38.1 % 06/06/22 1600       37.0 % 06/06/22 1524       41.2 % 03/04/22 1740       41.2 % 01/11/22     Glucose Fasting GTT       Glucose Tolerance Test 1 hour       Glucose Tolerance Test 3 hour       Gonorrhea (discrete)  Negative  01/11/22     Chlamydia (discrete)  Negative  01/11/22     RPR  Non Reactive  01/11/22     VDRL       Syphilis Antibody       HBsAg  Negative  01/11/22     Herpes Simplex Virus PCR       Herpes Simplex VIrus Culture       HIV  Non Reactive  01/11/22     Hep C RNA Quant PCR       Hep C Antibody  0.1 s/co ratio 01/11/22     AFP  26.9 ng/mL 03/21/22 1549    Group B Strep       GBS Susceptibility to Clindamycin       GBS Susceptibility to Erythromycin       Fetal Fibronectin       Genetic Testing, Maternal Blood ^ low risk - FEMALE  01/31/22           Drug Screening     Test Value Date Time    Urine Drug Screen       Amphetamine Screen  Negative ng/mL 02/08/22     Barbiturate Screen  Negative ng/mL 02/08/22     Benzodiazepine Screen  Negative ng/mL 02/08/22     Methadone Screen  Negative ng/mL 02/08/22     Phencyclidine Screen  Negative ng/mL 02/08/22     Opiates Screen       THC Screen       Cocaine Screen       Propoxyphene Screen  Negative ng/mL 02/08/22     Buprenorphine Screen       Methamphetamine Screen       Oxycodone Screen       Tricyclic Antidepressants Screen              Legend    ^: Historical                          Past OB History:       OB History    Para Term  AB Living   2 1 0 1 0 1   SAB IAB Ectopic Molar Multiple Live Births   0 0 0 0 0 1      # Outcome Date GA Lbr Femi/2nd Weight Sex Delivery Anes PTL Lv   2 Current            1  19 36w6d  2781 g (6 lb 2.1 oz) F Vag-Spont EPI Y RONDA      Birth Comments: GDM, Hypertension, Low fluid      Name: Marisel       Obstetric Comments   2019 - Marisel       Past Medical History: Past Medical History:   Diagnosis Date   • Acquired hypothyroidism    • Anxiety    • GDM (gestational diabetes mellitus), class A1 2022   • History of gestational diabetes     G1   • History of gestational hypertension 2019    G1   • History of varicella       Past Surgical History Past Surgical History:   Procedure Laterality Date   • INGUINAL HERNIA REPAIR Right 2006   • TONSILLECTOMY        Family History: Family History   Problem Relation Age of Onset   • Hypertension Mother    • Thyroid disease Mother    • Arthritis Brother    • Cancer Other    • Melanoma Maternal Grandfather    • Obesity Paternal Grandmother    • Breast cancer Paternal Aunt         Just learned last year. Dont know when.      Social History:  reports that she quit smoking about 9 years ago. Her smoking use included cigarettes. She started smoking about 23 years ago. She has a 12.00 pack-year smoking history. She has never used smokeless tobacco.   reports previous alcohol use.   reports previous drug use. Frequency: 5.00 times per week. Drug: Marijuana.                   General ROS Negative Findings:Headaches, Visual Changes, Epigastric pain, Anorexia, Nausia/Vomiting, ROM and Vaginal Bleeding    ROS     All other systems have been reviewed and are neg  Objective       Vital Signs Range for the last 24 hours  Temperature: Temp:  [98.6 °F (37 °C)] 98.6 °F (37 °C)   Temp Source: Temp src: Oral   BP: BP: (128-146)/() 134/90   Pulse:  Heart Rate:  [] 89   Respirations: Resp:  [16] 16   SPO2:     O2 Amount (l/min):     O2 Devices     Weight: Weight:  [110 kg (243 lb)] 110 kg (243 lb)     Physical Examination:   General:   alert, appears stated age and cooperative   Skin:   normal   HEENT:  Sclera clear   Lungs:      Heart:      Gastrointestinal:  Abdomen soft nontender   Lower Extremities:  Trace edema   :    Musculoskeletal:     Neuro:  No focal deficits, DTR 2+4 no clonus             Fetal Heart Rate Assessment   Method: Fetal HR Assessment Method: external   Beats/min: Fetal HR (beats/min): 130   Baseline: Fetal HR Baseline: normal range   Varibility: Fetal HR Variability: moderate (amplitude range 6 to 25 bpm)   Accels: Fetal HR Accelerations: absent   Decels: Fetal HR Decelerations: absent   Tracing Category:     NST-indications hypertension, interpretation reactive, moderate variability, accelerations present 15 x 15, no fetal deceleration noted, onset Kemar 40, end time 1316, no regular contractions noted.  Uterine Assessment   Method: Method: external tocotransducer   Frequency (min):     Ctx Count in 10 min:     Duration:     Intensity: Contraction Intensity: no contractions   Intensity by IUPC:     Resting Tone:     Resting Tone by IUPC:     [place] Units:       Laboratory Results:   Lab Results (last 24 hours)     Procedure Component Value Units Date/Time    Preeclampsia Panel [[id number]]  (Normal) Collected: 07/01/22 1159    Specimen: Blood Updated: 07/01/22 1303     Alkaline Phosphatase 91 U/L      ALT (SGPT) 12 U/L      AST (SGOT) 13 U/L      Creatinine 0.64 mg/dL      Total Bilirubin 0.2 mg/dL       U/L      Comment: Specimen hemolyzed.  Results may be affected.        Uric Acid 4.0 mg/dL     CBC (No Diff) [[id number]]  (Normal) Collected: 07/01/22 1159    Specimen: Blood Updated: 07/01/22 1235     WBC 9.55 10*3/mm3      RBC 4.43 10*6/mm3      Hemoglobin 13.7 g/dL      Hematocrit 39.1 %      MCV 88.3 fL      MCH  30.9 pg      MCHC 35.0 g/dL      RDW 13.2 %      RDW-SD 42.6 fl      MPV 9.8 fL      Platelets 166 10*3/mm3         Radiology Review:   Imaging Results (Last 24 Hours)     ** No results found for the last 24 hours. **        Other Studies:    Assessment & Plan       AMA in third trimester - normal cfDNA    GDM (gestational diabetes mellitus), class A1    Hypothyroidism complicating pregnancy, third trimester    Pregnancy        Assessment:  1.  Intrauterine pregnancy at 32w6d weeks gestation with reactive fetal status.    2.  Hypertension chronic hypertension versus gestational.  No evidence of severe disease.  3.  Gestational diabetes mellitus A1 well-controlled  4.  Hypothyroidism  5.  AMA  6.  Status post steroids fetal benefit  and   Plan:  1.  Discharged home, kick count, continue medications, continue fingersticks daily and daily blood pressure assessment.  Follow-up for scheduled appointment  testing  with Dr. Driver and subsequent visit with [facility] on .  Or as needed  2. Plan of care has been reviewed with patient.  3.  Risks, benefits of treatment plan have been discussed.  4.  All questions have been answered.  5      Zohaib Collazo DO  2022  13:40 EDT

## 2022-07-01 NOTE — PROGRESS NOTES
Denies ha, vision changes, RUQ & epigastric pain. Was on antepartum unit 6/22-6/27. Next OB appt 7/5/22.

## 2022-07-01 NOTE — PROGRESS NOTES
Documentation of the ultasound findings, images, and interpretations will be available in the patient's Viewpoint report located in the Chart Review Imaging tab in [facility].

## 2022-07-03 PROBLEM — Z87.59 HISTORY OF GESTATIONAL HYPERTENSION: Status: ACTIVE | Noted: 2022-07-03

## 2022-07-05 ENCOUNTER — ROUTINE PRENATAL (OUTPATIENT)
Dept: OBSTETRICS AND GYNECOLOGY | Facility: CLINIC | Age: 37
End: 2022-07-05

## 2022-07-05 VITALS — BODY MASS INDEX: 47.65 KG/M2 | DIASTOLIC BLOOD PRESSURE: 90 MMHG | SYSTOLIC BLOOD PRESSURE: 130 MMHG | WEIGHT: 244 LBS

## 2022-07-05 DIAGNOSIS — E03.9 HYPOTHYROIDISM COMPLICATING PREGNANCY, THIRD TRIMESTER: ICD-10-CM

## 2022-07-05 DIAGNOSIS — O13.3 GESTATIONAL HYPERTENSION, THIRD TRIMESTER: ICD-10-CM

## 2022-07-05 DIAGNOSIS — O24.410 GDM (GESTATIONAL DIABETES MELLITUS), CLASS A1: ICD-10-CM

## 2022-07-05 DIAGNOSIS — Z3A.30 30 WEEKS GESTATION OF PREGNANCY: ICD-10-CM

## 2022-07-05 DIAGNOSIS — O09.523 MULTIGRAVIDA OF ADVANCED MATERNAL AGE IN THIRD TRIMESTER: Primary | ICD-10-CM

## 2022-07-05 DIAGNOSIS — O99.283 HYPOTHYROIDISM COMPLICATING PREGNANCY, THIRD TRIMESTER: ICD-10-CM

## 2022-07-05 LAB
GLUCOSE UR STRIP-MCNC: NEGATIVE MG/DL
PROT UR STRIP-MCNC: NEGATIVE MG/DL

## 2022-07-05 PROCEDURE — 0502F SUBSEQUENT PRENATAL CARE: CPT | Performed by: OBSTETRICS & GYNECOLOGY

## 2022-07-05 NOTE — PROGRESS NOTES
OB FOLLOW UP  CC- Here for care of pregnancy        Lorie Lara is a 36 y.o.  33w3d patient being seen today for her obstetrical follow up visit. Patient reports no complaints. Pt feels well, good fm, occ BH ctx.NST    Her prenatal care is complicated by (and status) :  AMA  Patient Active Problem List   Diagnosis   • AMA in third trimester - normal cfDNA   • GDM (gestational diabetes mellitus), class A1   • Acquired hypothyroidism   • Gestational hypertension, third trimester - may represent undiagnosed cHTN   • Hypothyroidism complicating pregnancy, third trimester   • HTN (hypertension)   • Pregnancy   • History of gestational hypertension   • BMI 45.0-49.9, adult (HCC)   • Chronic hypertension with superimposed pre-eclampsia       Flu Status: Already given in current flu season  Ultrasound Today: No    ROS -   Patient Reports : No Problems  Patient Denies: Loss of Fluid, Vaginal Spotting, Vision Changes, Headaches, Nausea  and Vomiting   Fetal Movement : normal  All other systems reviewed and are negative.       The additional following portions of the patient's history were reviewed and updated as appropriate: allergies, current medications, past family history, past medical history, past surgical history and problem list.    I have reviewed and agree with the HPI, ROS, and historical information as entered above. Christa Driver MD    /90   Wt 111 kg (244 lb)   LMP 2021 (Exact Date)   BMI 47.65 kg/m²       EXAM:     Prenatal Vitals  BP: 130/90  Weight: 111 kg (244 lb)   Fetal Heart Rate: pos               Urine Glucose Read-only: Negative  Urine Protein Read-only: Negative       Assessment and Plan    Problem List Items Addressed This Visit            AMA in third trimester - normal cfDNA - Primary    GDM (gestational diabetes mellitus), class A1    Overview     NST starting at 36wk           Relevant Orders    Fetal Nonstress Test    Gestational hypertension,  third trimester - may represent undiagnosed cHTN    Overview     06/18/22 - 24 hour urine = 344 mg/dL           Relevant Medications    NIFEdipine XL (ADALAT CC) 30 MG 24 hr tablet    Other Relevant Orders    Fetal Nonstress Test    Hypothyroidism complicating pregnancy, third trimester    Relevant Medications    levothyroxine (SYNTHROID, LEVOTHROID) 100 MCG tablet    RESOLVED: 30 weeks gestation of pregnancy    Relevant Orders    POC Urinalysis Dipstick (Completed)    Fetal Nonstress Test          1. Pregnancy at 33w3d  2. Fetal status reassuring.  3. 28 week labs reviewed.    4. Activity and Exercise discussed.  5. Fetal movement/PTL or Labor precautions  6. Reviewed FSBS goals: fasting <90, 2hr PP < 120  7. Reviewed Pre-eclampsia signs/symptoms  Return in about 3 days (around 7/8/2022) for with [facility].    Christa Driver MD  07/05/2022

## 2022-07-05 NOTE — PROGRESS NOTES
OB FOLLOW UP    Lorie Lara is a 36 y.o.  33w3d patient being seen today for her obstetrical follow up visit. Patient reports no complaints.     Her prenatal care is complicated by (and status) :    Patient Active Problem List   Diagnosis   • AMA in third trimester - normal cfDNA   • GDM (gestational diabetes mellitus), class A1   • Acquired hypothyroidism   • Gestational hypertension, third trimester - may represent undiagnosed cHTN   • Hypothyroidism complicating pregnancy, third trimester   • HTN (hypertension)   • Pregnancy   • History of gestational hypertension   • BMI 45.0-49.9, adult (HCC)   • Chronic hypertension with superimposed pre-eclampsia       ROS -   Patient Reports : No Problems  Patient Denies: Loss of Fluid, Vaginal Spotting and Cramping/Contractions   Fetal Movement : normal      /90   Wt 111 kg (244 lb)   LMP 2021 (Exact Date)   BMI 47.65 kg/m²     Ultrasound Today: no    EXAM:  Vitals: See prenatal flowsheet   Abdomen: See prenatal flowsheet   Urine glucose/protein: See prenatal flowsheet   Pelvic: See prenatal flowsheet     Assessment    Diagnoses and all orders for this visit:    1. Multigravida of advanced maternal age in third trimester (Primary)    2. Gestational hypertension, third trimester  -     Fetal Nonstress Test    3. 30 weeks gestation of pregnancy  -     POC Urinalysis Dipstick  -     Fetal Nonstress Test; Future    4. GDM (gestational diabetes mellitus), class A1  -     Fetal Nonstress Test; Future    5. Hypothyroidism complicating pregnancy, third trimester    Other orders  -     SCANNED - PROCEDURE      Fetal Non-Stress Test  Patient: Lorie Lara  : 1985  MRN: [id number]  [facility]: [id number]  Date: 2022    Estimated Date of Delivery: 22  Gestational Age: 33w3d    Indication for NST Superimposed pre-eclampsia       Time On >20min           Interpretation    Baseline  beats per minute       Decelerations Absent        Additional Comments Reactive               Christa Driver MD      1. Pregnancy at 33w3d  2. Fetal status reassuring     Problem List Items Addressed This Visit            AMA in third trimester - normal cfDNA - Primary    GDM (gestational diabetes mellitus), class A1    Overview     NST starting at 36wk           Relevant Orders    Fetal Nonstress Test    Gestational hypertension, third trimester - may represent undiagnosed cHTN    Overview     22 - 24 hour urine = 344 mg/dL           Relevant Medications    NIFEdipine XL (ADALAT CC) 30 MG 24 hr tablet    Other Relevant Orders    Fetal Nonstress Test    Hypothyroidism complicating pregnancy, third trimester    Relevant Medications    levothyroxine (SYNTHROID, LEVOTHROID) 100 MCG tablet    RESOLVED: 30 weeks gestation of pregnancy    Relevant Orders    POC Urinalysis Dipstick (Completed)    Fetal Nonstress Test          Plan    1. Fetal movement/ Labor Precautions  Fetal movement/PTL or Labor precautions  Reviewed FSBS goals: fasting <90, 2hr PP < 120  Reviewed Pre-eclampsia signs/symptoms  Follow Up: Return in about 3 days (around 2022) for with [facility].        Christa Driver MD  2022

## 2022-07-08 ENCOUNTER — HOSPITAL ENCOUNTER (OUTPATIENT)
Dept: WOMENS IMAGING | Facility: HOSPITAL | Age: 37
Discharge: HOME | End: 2022-07-08
Admitting: OBSTETRICS & GYNECOLOGY

## 2022-07-08 ENCOUNTER — OFFICE VISIT (OUTPATIENT)
Dept: OBSTETRICS AND GYNECOLOGY | Facility: HOSPITAL | Age: 37
End: 2022-07-08

## 2022-07-08 VITALS — WEIGHT: 247 LBS | DIASTOLIC BLOOD PRESSURE: 94 MMHG | BODY MASS INDEX: 48.24 KG/M2 | SYSTOLIC BLOOD PRESSURE: 129 MMHG

## 2022-07-08 DIAGNOSIS — O11.9 CHRONIC HYPERTENSION WITH SUPERIMPOSED PRE-ECLAMPSIA: Primary | ICD-10-CM

## 2022-07-08 DIAGNOSIS — O24.410 GDM (GESTATIONAL DIABETES MELLITUS), CLASS A1: ICD-10-CM

## 2022-07-08 DIAGNOSIS — O13.3 GESTATIONAL HYPERTENSION, THIRD TRIMESTER: ICD-10-CM

## 2022-07-08 DIAGNOSIS — E03.9 HYPOTHYROIDISM COMPLICATING PREGNANCY, THIRD TRIMESTER: ICD-10-CM

## 2022-07-08 DIAGNOSIS — O99.283 HYPOTHYROIDISM COMPLICATING PREGNANCY, THIRD TRIMESTER: ICD-10-CM

## 2022-07-08 DIAGNOSIS — O09.523 MULTIGRAVIDA OF ADVANCED MATERNAL AGE IN THIRD TRIMESTER: ICD-10-CM

## 2022-07-08 PROCEDURE — 76819 FETAL BIOPHYS PROFIL W/O NST: CPT

## 2022-07-08 PROCEDURE — 76820 UMBILICAL ARTERY ECHO: CPT

## 2022-07-08 PROCEDURE — 76820 UMBILICAL ARTERY ECHO: CPT | Performed by: OBSTETRICS & GYNECOLOGY

## 2022-07-08 PROCEDURE — 76816 OB US FOLLOW-UP PER FETUS: CPT

## 2022-07-08 PROCEDURE — 76816 OB US FOLLOW-UP PER FETUS: CPT | Performed by: OBSTETRICS & GYNECOLOGY

## 2022-07-08 PROCEDURE — 76819 FETAL BIOPHYS PROFIL W/O NST: CPT | Performed by: OBSTETRICS & GYNECOLOGY

## 2022-07-08 NOTE — PROGRESS NOTES
Patient seen in Maternal Fetal Medicine clinic today. Please see full note in under imaging tab of patient chart in [facility] (Viewpoint report).    Lori Rachel MD

## 2022-07-12 ENCOUNTER — PREP FOR SURGERY (OUTPATIENT)
Dept: OTHER | Facility: HOSPITAL | Age: 37
End: 2022-07-12

## 2022-07-12 ENCOUNTER — ROUTINE PRENATAL (OUTPATIENT)
Dept: OBSTETRICS AND GYNECOLOGY | Facility: CLINIC | Age: 37
End: 2022-07-12

## 2022-07-12 VITALS — DIASTOLIC BLOOD PRESSURE: 70 MMHG | SYSTOLIC BLOOD PRESSURE: 140 MMHG | BODY MASS INDEX: 47.85 KG/M2 | WEIGHT: 245 LBS

## 2022-07-12 DIAGNOSIS — E03.9 HYPOTHYROIDISM COMPLICATING PREGNANCY, THIRD TRIMESTER: ICD-10-CM

## 2022-07-12 DIAGNOSIS — O99.283 HYPOTHYROIDISM COMPLICATING PREGNANCY, THIRD TRIMESTER: ICD-10-CM

## 2022-07-12 DIAGNOSIS — O13.3 GESTATIONAL HYPERTENSION, THIRD TRIMESTER: ICD-10-CM

## 2022-07-12 DIAGNOSIS — O09.523 MULTIGRAVIDA OF ADVANCED MATERNAL AGE IN THIRD TRIMESTER: Primary | ICD-10-CM

## 2022-07-12 DIAGNOSIS — Z87.59 HISTORY OF GESTATIONAL HYPERTENSION: ICD-10-CM

## 2022-07-12 DIAGNOSIS — O11.9 CHRONIC HYPERTENSION WITH SUPERIMPOSED PRE-ECLAMPSIA: ICD-10-CM

## 2022-07-12 DIAGNOSIS — Z3A.34 34 WEEKS GESTATION OF PREGNANCY: ICD-10-CM

## 2022-07-12 DIAGNOSIS — Z3A.30 30 WEEKS GESTATION OF PREGNANCY: ICD-10-CM

## 2022-07-12 DIAGNOSIS — O24.410 GDM (GESTATIONAL DIABETES MELLITUS), CLASS A1: ICD-10-CM

## 2022-07-12 DIAGNOSIS — O11.9 CHRONIC HYPERTENSION WITH SUPERIMPOSED PREECLAMPSIA: Primary | ICD-10-CM

## 2022-07-12 LAB
GLUCOSE UR STRIP-MCNC: NEGATIVE MG/DL
PROT UR STRIP-MCNC: NEGATIVE MG/DL

## 2022-07-12 PROCEDURE — 0502F SUBSEQUENT PRENATAL CARE: CPT | Performed by: OBSTETRICS & GYNECOLOGY

## 2022-07-12 RX ORDER — METHYLERGONOVINE MALEATE 0.2 MG/ML
200 INJECTION INTRAVENOUS ONCE AS NEEDED
Status: CANCELLED | OUTPATIENT
Start: 2022-07-12

## 2022-07-12 RX ORDER — SODIUM CHLORIDE 0.9 % (FLUSH) 0.9 %
3-10 SYRINGE (ML) INJECTION AS NEEDED
Status: CANCELLED | OUTPATIENT
Start: 2022-07-12

## 2022-07-12 RX ORDER — OXYTOCIN/0.9 % SODIUM CHLORIDE 30/500 ML
650 PLASTIC BAG, INJECTION (ML) INTRAVENOUS ONCE
Status: CANCELLED | OUTPATIENT
Start: 2022-07-12 | End: 2022-07-12

## 2022-07-12 RX ORDER — MAGNESIUM CARB/ALUMINUM HYDROX 105-160MG
30 TABLET,CHEWABLE ORAL ONCE
Status: CANCELLED | OUTPATIENT
Start: 2022-07-12 | End: 2022-07-12

## 2022-07-12 RX ORDER — ONDANSETRON 4 MG/1
4 TABLET, FILM COATED ORAL EVERY 6 HOURS PRN
Status: CANCELLED | OUTPATIENT
Start: 2022-07-12

## 2022-07-12 RX ORDER — IBUPROFEN 600 MG/1
600 TABLET ORAL EVERY 6 HOURS PRN
Status: CANCELLED | OUTPATIENT
Start: 2022-07-12

## 2022-07-12 RX ORDER — OXYTOCIN/0.9 % SODIUM CHLORIDE 30/500 ML
85 PLASTIC BAG, INJECTION (ML) INTRAVENOUS ONCE
Status: CANCELLED | OUTPATIENT
Start: 2022-07-12 | End: 2022-07-12

## 2022-07-12 RX ORDER — ONDANSETRON 2 MG/ML
4 INJECTION INTRAMUSCULAR; INTRAVENOUS EVERY 6 HOURS PRN
Status: CANCELLED | OUTPATIENT
Start: 2022-07-12

## 2022-07-12 RX ORDER — BUTORPHANOL TARTRATE 1 MG/ML
1 INJECTION, SOLUTION INTRAMUSCULAR; INTRAVENOUS
Status: CANCELLED | OUTPATIENT
Start: 2022-07-12

## 2022-07-12 RX ORDER — ACETAMINOPHEN 325 MG/1
650 TABLET ORAL EVERY 4 HOURS PRN
Status: CANCELLED | OUTPATIENT
Start: 2022-07-12

## 2022-07-12 RX ORDER — CARBOPROST TROMETHAMINE 250 UG/ML
250 INJECTION, SOLUTION INTRAMUSCULAR AS NEEDED
Status: CANCELLED | OUTPATIENT
Start: 2022-07-12

## 2022-07-12 RX ORDER — SODIUM CHLORIDE, SODIUM LACTATE, POTASSIUM CHLORIDE, CALCIUM CHLORIDE 600; 310; 30; 20 MG/100ML; MG/100ML; MG/100ML; MG/100ML
125 INJECTION, SOLUTION INTRAVENOUS CONTINUOUS
Status: CANCELLED | OUTPATIENT
Start: 2022-07-12

## 2022-07-12 RX ORDER — SODIUM CHLORIDE 0.9 % (FLUSH) 0.9 %
3 SYRINGE (ML) INJECTION EVERY 12 HOURS SCHEDULED
Status: CANCELLED | OUTPATIENT
Start: 2022-07-12

## 2022-07-12 RX ORDER — MISOPROSTOL 200 UG/1
800 TABLET ORAL AS NEEDED
Status: CANCELLED | OUTPATIENT
Start: 2022-07-12

## 2022-07-12 RX ORDER — LIDOCAINE HYDROCHLORIDE 10 MG/ML
5 INJECTION, SOLUTION EPIDURAL; INFILTRATION; INTRACAUDAL; PERINEURAL AS NEEDED
Status: CANCELLED | OUTPATIENT
Start: 2022-07-12

## 2022-07-12 RX ORDER — OXYTOCIN/0.9 % SODIUM CHLORIDE 30/500 ML
2-30 PLASTIC BAG, INJECTION (ML) INTRAVENOUS
Status: CANCELLED | OUTPATIENT
Start: 2022-07-12

## 2022-07-12 NOTE — PROGRESS NOTES
OB FOLLOW UP  CC- Here for care of pregnancy        Lorie Lara is a 36 y.o.  34w3d patient being seen today for her obstetrical follow up visit. Patient reports no complaints. Pt feels well, good fm, occ ctx. Some upper GI pain on the right yesterday, gone now    Her prenatal care is complicated by (and status) : history of gestational hypertension   Patient Active Problem List   Diagnosis   • AMA in third trimester - normal cfDNA   • GDM (gestational diabetes mellitus), class A1   • Acquired hypothyroidism   • Gestational hypertension, third trimester - may represent undiagnosed cHTN   • Hypothyroidism complicating pregnancy, third trimester   • HTN (hypertension)   • Pregnancy   • History of gestational hypertension   • BMI 45.0-49.9, adult (HCC)   • Chronic hypertension with superimposed pre-eclampsia       Flu Status: Already given in current flu season  Ultrasound Today: No    ROS -   Patient Reports : No Problems  Patient Denies: Loss of Fluid, Vaginal Spotting, Vision Changes and Headaches  Fetal Movement : normal  All other systems reviewed and are negative.       The additional following portions of the patient's history were reviewed and updated as appropriate: allergies, current medications, past family history, past medical history, past social history, past surgical history and problem list.    I have reviewed and agree with the HPI, ROS, and historical information as entered above. Christa Driver MD    /70   Wt 111 kg (245 lb)   LMP 2021 (Exact Date)   Breastfeeding Unknown   BMI 47.85 kg/m²       EXAM:     Prenatal Vitals  BP: 140/70  Weight: 111 kg (245 lb)   Fetal Heart Rate: 130         Non Stress Test: minutes >20  non-stress test: NST: Reactive  indication: Hypertension superimposed preeclampsia and A1 diabetes        Urine Glucose Read-only: Negative  Urine Protein Read-only: Negative       Assessment and Plan    Problem List Items Addressed This  Visit     AMA in third trimester - normal cfDNA - Primary    GDM (gestational diabetes mellitus), class A1    Overview     NST starting at 36wk           Gestational hypertension, third trimester - may represent undiagnosed cHTN    Overview     06/18/22 - 24 hour urine = 344 mg/dL           Relevant Medications    NIFEdipine XL (ADALAT CC) 30 MG 24 hr tablet    Other Relevant Orders    Fetal Nonstress Test    Hypothyroidism complicating pregnancy, third trimester    Relevant Medications    levothyroxine (SYNTHROID, LEVOTHROID) 100 MCG tablet    Pregnancy    Relevant Orders    POC Urinalysis Dipstick (Completed)    History of gestational hypertension    BMI 45.0-49.9, adult (HCC)    Chronic hypertension with superimposed pre-eclampsia    Overview     Technically superimposed preeclampsia on either gestational hypertension or chronic hypertension, likely the latter as she had some elevated pressures early on.  But proteinuria is new onset.  Twice-weekly testing with NST here on Tuesday and BPP at [facility] on Fridays.  Induction of labor at 37 weeks -needs scheduled           Relevant Medications    NIFEdipine XL (ADALAT CC) 30 MG 24 hr tablet    Other Relevant Orders    AlP+ALT+AST+Creat+LD+TBili+..    CBC (No Diff)    RESOLVED: 30 weeks gestation of pregnancy          1. Pregnancy at 34w3d  2. Fetal status reassuring.   3. GBS next visit  4. Activity and Exercise discussed.  5. Fetal movement/PTL or Labor precautions  6. Reviewed FSBS goals: fasting <90, 2hr PP < 120  7. Reviewed Pre-eclampsia signs/symptoms  8. Discussed bASA for PIH prevention from 12 to 36wk  9. We will schedule induction of labor at 37 weeks  Return in about 1 week (around 7/19/2022) for WITH NST; [facility] Friday for BPP.    Christa Driver MD  07/12/2022     Addendum:    I spoke with the medical director with her insurance yesterday regarding their denial of her admission from 7/22. I tried to bring home the point that the patient is technically  superimposed preeclampsia. She was this at her previous admission with Dr. Rivera. We had been watching her for either chronic hypertension or mild gestational hypertension. At that previous admission she was admitted with severe range blood pressures and a 24-hour urine protein showed mild range proteinuria which was new. Of note, he was stuck on the fact that it could not be severe because she did not have repeated blood pressures of 160s over 110s. I am not sure if there are severe pressures and what ever system nurses have to transfer the blood pressures from into [facility] that need to be acknowledged.  However, she was already severe in the previous admission that this admission I had increased her nifedipine to control her blood pressures and so of course they were not out of control.  I watched her throughout the weekend to be sure she would stay stable and repeated [facility] ultrasound on Monday prior to discharge home as I think she is very reliable and monitoring for worsening symptoms monitoring her blood pressures.    Christa Driver MD  07/12/22  16:49 EDT

## 2022-07-13 ENCOUNTER — PATIENT MESSAGE (OUTPATIENT)
Dept: OBSTETRICS AND GYNECOLOGY | Facility: CLINIC | Age: 37
End: 2022-07-13

## 2022-07-13 LAB
ALP SERPL-CCNC: 93 U/L (ref 39–117)
ALT SERPL-CCNC: 12 U/L (ref 1–33)
AST SERPL-CCNC: 14 U/L (ref 1–32)
BASOPHILS # BLD AUTO: 0.02 10*3/MM3 (ref 0–0.2)
BASOPHILS NFR BLD AUTO: 0.2 % (ref 0–1.5)
BILIRUB SERPL-MCNC: 0.2 MG/DL (ref 0–1.2)
CREAT SERPL-MCNC: 0.62 MG/DL (ref 0.57–1)
EGFRCR SERPLBLD CKD-EPI 2021: 119.3 ML/MIN/1.73
EOSINOPHIL # BLD AUTO: 0.09 10*3/MM3 (ref 0–0.4)
EOSINOPHIL NFR BLD AUTO: 1 % (ref 0.3–6.2)
ERYTHROCYTE [DISTWIDTH] IN BLOOD BY AUTOMATED COUNT: 13 % (ref 12.3–15.4)
HCT VFR BLD AUTO: 39.1 % (ref 34–46.6)
HGB BLD-MCNC: 13.7 G/DL (ref 12–15.9)
IMM GRANULOCYTES # BLD AUTO: 0.06 10*3/MM3 (ref 0–0.05)
IMM GRANULOCYTES NFR BLD AUTO: 0.6 % (ref 0–0.5)
LDH SERPL-CCNC: 142 U/L (ref 135–214)
LYMPHOCYTES # BLD AUTO: 1.87 10*3/MM3 (ref 0.7–3.1)
LYMPHOCYTES NFR BLD AUTO: 20.1 % (ref 19.6–45.3)
MCH RBC QN AUTO: 30.9 PG (ref 26.6–33)
MCHC RBC AUTO-ENTMCNC: 35 G/DL (ref 31.5–35.7)
MCV RBC AUTO: 88.3 FL (ref 79–97)
MONOCYTES # BLD AUTO: 0.72 10*3/MM3 (ref 0.1–0.9)
MONOCYTES NFR BLD AUTO: 7.8 % (ref 5–12)
NEUTROPHILS # BLD AUTO: 6.53 10*3/MM3 (ref 1.7–7)
NEUTROPHILS NFR BLD AUTO: 70.3 % (ref 42.7–76)
NRBC BLD AUTO-RTO: 0 /100 WBC (ref 0–0.2)
PLATELET # BLD AUTO: 173 10*3/MM3 (ref 140–450)
RBC # BLD AUTO: 4.43 10*6/MM3 (ref 3.77–5.28)
URATE SERPL-MCNC: 4.5 MG/DL (ref 2.4–5.7)
WBC # BLD AUTO: 9.29 10*3/MM3 (ref 3.4–10.8)

## 2022-07-15 ENCOUNTER — HOSPITAL ENCOUNTER (OUTPATIENT)
Dept: WOMENS IMAGING | Facility: HOSPITAL | Age: 37
Discharge: HOME | End: 2022-07-15
Admitting: OBSTETRICS & GYNECOLOGY

## 2022-07-15 ENCOUNTER — OFFICE VISIT (OUTPATIENT)
Dept: OBSTETRICS AND GYNECOLOGY | Facility: HOSPITAL | Age: 37
End: 2022-07-15

## 2022-07-15 VITALS — WEIGHT: 248.2 LBS | DIASTOLIC BLOOD PRESSURE: 92 MMHG | BODY MASS INDEX: 48.47 KG/M2 | SYSTOLIC BLOOD PRESSURE: 136 MMHG

## 2022-07-15 DIAGNOSIS — O11.9 CHRONIC HYPERTENSION WITH SUPERIMPOSED PRE-ECLAMPSIA: ICD-10-CM

## 2022-07-15 DIAGNOSIS — O09.523 MULTIGRAVIDA OF ADVANCED MATERNAL AGE IN THIRD TRIMESTER: ICD-10-CM

## 2022-07-15 DIAGNOSIS — E03.9 ACQUIRED HYPOTHYROIDISM: Primary | ICD-10-CM

## 2022-07-15 DIAGNOSIS — O13.3 GESTATIONAL HYPERTENSION, THIRD TRIMESTER: ICD-10-CM

## 2022-07-15 PROCEDURE — 76818 FETAL BIOPHYS PROFILE W/NST: CPT

## 2022-07-15 PROCEDURE — 76820 UMBILICAL ARTERY ECHO: CPT | Performed by: OBSTETRICS & GYNECOLOGY

## 2022-07-15 PROCEDURE — 76818 FETAL BIOPHYS PROFILE W/NST: CPT | Performed by: OBSTETRICS & GYNECOLOGY

## 2022-07-15 PROCEDURE — 76820 UMBILICAL ARTERY ECHO: CPT

## 2022-07-15 NOTE — PROGRESS NOTES
Documentation of the ultasound findings, images, and interpretations will be available in the patient's Viewpoint report located in the Chart Review Imaging tab in [facility].

## 2022-07-18 DIAGNOSIS — I10 HYPERTENSION, UNSPECIFIED TYPE: ICD-10-CM

## 2022-07-18 DIAGNOSIS — O13.3 GESTATIONAL HYPERTENSION, THIRD TRIMESTER: ICD-10-CM

## 2022-07-18 DIAGNOSIS — O24.410 GDM (GESTATIONAL DIABETES MELLITUS), CLASS A1: Primary | ICD-10-CM

## 2022-07-19 ENCOUNTER — ROUTINE PRENATAL (OUTPATIENT)
Dept: OBSTETRICS AND GYNECOLOGY | Facility: CLINIC | Age: 37
End: 2022-07-19

## 2022-07-19 VITALS — WEIGHT: 249 LBS | BODY MASS INDEX: 48.63 KG/M2 | SYSTOLIC BLOOD PRESSURE: 130 MMHG | DIASTOLIC BLOOD PRESSURE: 90 MMHG

## 2022-07-19 DIAGNOSIS — E03.9 HYPOTHYROIDISM COMPLICATING PREGNANCY, THIRD TRIMESTER: ICD-10-CM

## 2022-07-19 DIAGNOSIS — O24.410 GDM (GESTATIONAL DIABETES MELLITUS), CLASS A1: ICD-10-CM

## 2022-07-19 DIAGNOSIS — O13.3 GESTATIONAL HYPERTENSION, THIRD TRIMESTER: ICD-10-CM

## 2022-07-19 DIAGNOSIS — Z87.59 HISTORY OF GESTATIONAL HYPERTENSION: ICD-10-CM

## 2022-07-19 DIAGNOSIS — O99.283 HYPOTHYROIDISM COMPLICATING PREGNANCY, THIRD TRIMESTER: ICD-10-CM

## 2022-07-19 DIAGNOSIS — O09.523 MULTIGRAVIDA OF ADVANCED MATERNAL AGE IN THIRD TRIMESTER: Primary | ICD-10-CM

## 2022-07-19 LAB
GLUCOSE UR STRIP-MCNC: NEGATIVE MG/DL
PROT UR STRIP-MCNC: NEGATIVE MG/DL

## 2022-07-19 PROCEDURE — 0502F SUBSEQUENT PRENATAL CARE: CPT | Performed by: OBSTETRICS & GYNECOLOGY

## 2022-07-22 ENCOUNTER — HOSPITAL ENCOUNTER (OUTPATIENT)
Dept: WOMENS IMAGING | Facility: HOSPITAL | Age: 37
Discharge: HOME | End: 2022-07-22
Admitting: OBSTETRICS & GYNECOLOGY

## 2022-07-22 ENCOUNTER — OFFICE VISIT (OUTPATIENT)
Dept: OBSTETRICS AND GYNECOLOGY | Facility: HOSPITAL | Age: 37
End: 2022-07-22

## 2022-07-22 VITALS — BODY MASS INDEX: 49.02 KG/M2 | WEIGHT: 251 LBS | SYSTOLIC BLOOD PRESSURE: 124 MMHG | DIASTOLIC BLOOD PRESSURE: 89 MMHG

## 2022-07-22 DIAGNOSIS — O24.410 GDM (GESTATIONAL DIABETES MELLITUS), CLASS A1: ICD-10-CM

## 2022-07-22 DIAGNOSIS — Z3A.35 35 WEEKS GESTATION OF PREGNANCY: ICD-10-CM

## 2022-07-22 DIAGNOSIS — I15.9 SECONDARY HYPERTENSION: ICD-10-CM

## 2022-07-22 DIAGNOSIS — O09.523 MULTIGRAVIDA OF ADVANCED MATERNAL AGE IN THIRD TRIMESTER: Primary | ICD-10-CM

## 2022-07-22 DIAGNOSIS — Z3A.36 36 WEEKS GESTATION OF PREGNANCY: Primary | ICD-10-CM

## 2022-07-22 DIAGNOSIS — E03.9 ACQUIRED HYPOTHYROIDISM: ICD-10-CM

## 2022-07-22 DIAGNOSIS — O09.523 MULTIGRAVIDA OF ADVANCED MATERNAL AGE IN THIRD TRIMESTER: ICD-10-CM

## 2022-07-22 DIAGNOSIS — O11.9 CHRONIC HYPERTENSION WITH SUPERIMPOSED PRE-ECLAMPSIA: ICD-10-CM

## 2022-07-22 PROCEDURE — 76820 UMBILICAL ARTERY ECHO: CPT

## 2022-07-22 PROCEDURE — 76819 FETAL BIOPHYS PROFIL W/O NST: CPT | Performed by: OBSTETRICS & GYNECOLOGY

## 2022-07-22 PROCEDURE — 76820 UMBILICAL ARTERY ECHO: CPT | Performed by: OBSTETRICS & GYNECOLOGY

## 2022-07-22 PROCEDURE — 76819 FETAL BIOPHYS PROFIL W/O NST: CPT

## 2022-07-22 NOTE — PROGRESS NOTES
Patient seen in Maternal Fetal Medicine clinic today. Please see full note in under imaging tab of patient chart in [facility] (Viewpoint report).    Shana Hutson MD

## 2022-07-25 ENCOUNTER — ROUTINE PRENATAL (OUTPATIENT)
Dept: OBSTETRICS AND GYNECOLOGY | Facility: CLINIC | Age: 37
End: 2022-07-25

## 2022-07-25 ENCOUNTER — HOSPITAL ENCOUNTER (INPATIENT)
Facility: HOSPITAL | Age: 37
LOS: 3 days | Discharge: HOME | End: 2022-07-28
Attending: OBSTETRICS & GYNECOLOGY | Admitting: OBSTETRICS & GYNECOLOGY

## 2022-07-25 ENCOUNTER — PREP FOR SURGERY (OUTPATIENT)
Dept: OTHER | Facility: HOSPITAL | Age: 37
End: 2022-07-25

## 2022-07-25 VITALS — SYSTOLIC BLOOD PRESSURE: 130 MMHG | BODY MASS INDEX: 49.61 KG/M2 | WEIGHT: 254 LBS | DIASTOLIC BLOOD PRESSURE: 90 MMHG

## 2022-07-25 DIAGNOSIS — O09.523 MULTIGRAVIDA OF ADVANCED MATERNAL AGE IN THIRD TRIMESTER: ICD-10-CM

## 2022-07-25 DIAGNOSIS — O11.9 CHRONIC HYPERTENSION WITH SUPERIMPOSED PREECLAMPSIA: ICD-10-CM

## 2022-07-25 DIAGNOSIS — O28.8 NON-STRESS TEST NONREACTIVE: ICD-10-CM

## 2022-07-25 DIAGNOSIS — Z34.90 PREGNANCY, UNSPECIFIED GESTATIONAL AGE: Primary | ICD-10-CM

## 2022-07-25 DIAGNOSIS — O13.3 GESTATIONAL HYPERTENSION, THIRD TRIMESTER: ICD-10-CM

## 2022-07-25 DIAGNOSIS — Z92.89 H/O FETAL BIOPHYSICAL PROFILE: ICD-10-CM

## 2022-07-25 DIAGNOSIS — O36.8130 DECREASED FETAL MOVEMENTS IN THIRD TRIMESTER, SINGLE OR UNSPECIFIED FETUS: ICD-10-CM

## 2022-07-25 DIAGNOSIS — O99.283 HYPOTHYROIDISM COMPLICATING PREGNANCY, THIRD TRIMESTER: ICD-10-CM

## 2022-07-25 DIAGNOSIS — E03.9 HYPOTHYROIDISM COMPLICATING PREGNANCY, THIRD TRIMESTER: ICD-10-CM

## 2022-07-25 DIAGNOSIS — O24.410 GDM (GESTATIONAL DIABETES MELLITUS), CLASS A1: ICD-10-CM

## 2022-07-25 DIAGNOSIS — O28.8 AFI (AMNIOTIC FLUID INDEX) BORDERLINE LOW: ICD-10-CM

## 2022-07-25 LAB
ABO GROUP BLD: NORMAL
ABO GROUP BLD: NORMAL
ALP SERPL-CCNC: 116 U/L (ref 39–117)
ALT SERPL W P-5'-P-CCNC: 10 U/L (ref 1–33)
AST SERPL-CCNC: 15 U/L (ref 1–32)
BILIRUB SERPL-MCNC: 0.2 MG/DL (ref 0–1.2)
BLD GP AB SCN SERPL QL: NEGATIVE
CREAT SERPL-MCNC: 0.59 MG/DL (ref 0.57–1)
DEPRECATED RDW RBC AUTO: 40.2 FL (ref 37–54)
ERYTHROCYTE [DISTWIDTH] IN BLOOD BY AUTOMATED COUNT: 12.9 % (ref 12.3–15.4)
GLUCOSE BLDC GLUCOMTR-MCNC: 123 MG/DL (ref 70–130)
GLUCOSE UR STRIP-MCNC: NEGATIVE MG/DL
HCT VFR BLD AUTO: 37.5 % (ref 34–46.6)
HGB BLD-MCNC: 13.2 G/DL (ref 12–15.9)
LDH SERPL-CCNC: 161 U/L (ref 135–214)
MCH RBC QN AUTO: 30.5 PG (ref 26.6–33)
MCHC RBC AUTO-ENTMCNC: 35.2 G/DL (ref 31.5–35.7)
MCV RBC AUTO: 86.6 FL (ref 79–97)
PLATELET # BLD AUTO: 165 10*3/MM3 (ref 140–450)
PMV BLD AUTO: 9.4 FL (ref 6–12)
PROT UR STRIP-MCNC: NEGATIVE MG/DL
RBC # BLD AUTO: 4.33 10*6/MM3 (ref 3.77–5.28)
RH BLD: POSITIVE
RH BLD: POSITIVE
SARS-COV-2 RDRP RESP QL NAA+PROBE: NORMAL
T&S EXPIRATION DATE: NORMAL
URATE SERPL-MCNC: 3.7 MG/DL (ref 2.4–5.7)
WBC NRBC COR # BLD: 9.43 10*3/MM3 (ref 3.4–10.8)

## 2022-07-25 PROCEDURE — 86901 BLOOD TYPING SEROLOGIC RH(D): CPT | Performed by: OBSTETRICS & GYNECOLOGY

## 2022-07-25 PROCEDURE — 82565 ASSAY OF CREATININE: CPT | Performed by: OBSTETRICS & GYNECOLOGY

## 2022-07-25 PROCEDURE — 84450 TRANSFERASE (AST) (SGOT): CPT | Performed by: OBSTETRICS & GYNECOLOGY

## 2022-07-25 PROCEDURE — 51703 INSERT BLADDER CATH COMPLEX: CPT

## 2022-07-25 PROCEDURE — 25010000002 BUTORPHANOL PER 1 MG: Performed by: OBSTETRICS & GYNECOLOGY

## 2022-07-25 PROCEDURE — 84075 ASSAY ALKALINE PHOSPHATASE: CPT | Performed by: OBSTETRICS & GYNECOLOGY

## 2022-07-25 PROCEDURE — 59200 INSERT CERVICAL DILATOR: CPT | Performed by: OBSTETRICS & GYNECOLOGY

## 2022-07-25 PROCEDURE — 84550 ASSAY OF BLOOD/URIC ACID: CPT | Performed by: OBSTETRICS & GYNECOLOGY

## 2022-07-25 PROCEDURE — 82962 GLUCOSE BLOOD TEST: CPT

## 2022-07-25 PROCEDURE — 86900 BLOOD TYPING SEROLOGIC ABO: CPT

## 2022-07-25 PROCEDURE — 85027 COMPLETE CBC AUTOMATED: CPT | Performed by: OBSTETRICS & GYNECOLOGY

## 2022-07-25 PROCEDURE — 83615 LACTATE (LD) (LDH) ENZYME: CPT | Performed by: OBSTETRICS & GYNECOLOGY

## 2022-07-25 PROCEDURE — 86850 RBC ANTIBODY SCREEN: CPT | Performed by: OBSTETRICS & GYNECOLOGY

## 2022-07-25 PROCEDURE — 84460 ALANINE AMINO (ALT) (SGPT): CPT | Performed by: OBSTETRICS & GYNECOLOGY

## 2022-07-25 PROCEDURE — 86901 BLOOD TYPING SEROLOGIC RH(D): CPT

## 2022-07-25 PROCEDURE — 59025 FETAL NON-STRESS TEST: CPT

## 2022-07-25 PROCEDURE — 87635 SARS-COV-2 COVID-19 AMP PRB: CPT | Performed by: OBSTETRICS & GYNECOLOGY

## 2022-07-25 PROCEDURE — 0502F SUBSEQUENT PRENATAL CARE: CPT | Performed by: OBSTETRICS & GYNECOLOGY

## 2022-07-25 PROCEDURE — 82247 BILIRUBIN TOTAL: CPT | Performed by: OBSTETRICS & GYNECOLOGY

## 2022-07-25 PROCEDURE — C1755 CATHETER, INTRASPINAL: HCPCS

## 2022-07-25 PROCEDURE — 86900 BLOOD TYPING SEROLOGIC ABO: CPT | Performed by: OBSTETRICS & GYNECOLOGY

## 2022-07-25 RX ORDER — MAGNESIUM CARB/ALUMINUM HYDROX 105-160MG
30 TABLET,CHEWABLE ORAL ONCE
Status: DISCONTINUED | OUTPATIENT
Start: 2022-07-25 | End: 2022-07-26 | Stop reason: HOSPADM

## 2022-07-25 RX ORDER — SODIUM CHLORIDE 0.9 % (FLUSH) 0.9 %
3 SYRINGE (ML) INJECTION EVERY 12 HOURS SCHEDULED
Status: DISCONTINUED | OUTPATIENT
Start: 2022-07-25 | End: 2022-07-26 | Stop reason: HOSPADM

## 2022-07-25 RX ORDER — SODIUM CHLORIDE, SODIUM LACTATE, POTASSIUM CHLORIDE, CALCIUM CHLORIDE 600; 310; 30; 20 MG/100ML; MG/100ML; MG/100ML; MG/100ML
125 INJECTION, SOLUTION INTRAVENOUS CONTINUOUS
Status: DISCONTINUED | OUTPATIENT
Start: 2022-07-25 | End: 2022-07-28 | Stop reason: HOSPADM

## 2022-07-25 RX ORDER — LABETALOL HYDROCHLORIDE 5 MG/ML
20-80 INJECTION, SOLUTION INTRAVENOUS
Status: ACTIVE | OUTPATIENT
Start: 2022-07-25 | End: 2022-07-26

## 2022-07-25 RX ORDER — SODIUM CHLORIDE 0.9 % (FLUSH) 0.9 %
3-10 SYRINGE (ML) INJECTION AS NEEDED
Status: DISCONTINUED | OUTPATIENT
Start: 2022-07-25 | End: 2022-07-26 | Stop reason: HOSPADM

## 2022-07-25 RX ORDER — LIDOCAINE HYDROCHLORIDE 10 MG/ML
5 INJECTION, SOLUTION EPIDURAL; INFILTRATION; INTRACAUDAL; PERINEURAL AS NEEDED
Status: DISCONTINUED | OUTPATIENT
Start: 2022-07-25 | End: 2022-07-26 | Stop reason: HOSPADM

## 2022-07-25 RX ORDER — ONDANSETRON 4 MG/1
4 TABLET, FILM COATED ORAL EVERY 6 HOURS PRN
Status: DISCONTINUED | OUTPATIENT
Start: 2022-07-25 | End: 2022-07-26 | Stop reason: HOSPADM

## 2022-07-25 RX ORDER — ONDANSETRON 2 MG/ML
4 INJECTION INTRAMUSCULAR; INTRAVENOUS EVERY 6 HOURS PRN
Status: DISCONTINUED | OUTPATIENT
Start: 2022-07-25 | End: 2022-07-26 | Stop reason: HOSPADM

## 2022-07-25 RX ORDER — BUTORPHANOL TARTRATE 1 MG/ML
1 INJECTION, SOLUTION INTRAMUSCULAR; INTRAVENOUS
Status: DISCONTINUED | OUTPATIENT
Start: 2022-07-25 | End: 2022-07-26 | Stop reason: HOSPADM

## 2022-07-25 RX ORDER — OXYTOCIN/0.9 % SODIUM CHLORIDE 30/500 ML
2-30 PLASTIC BAG, INJECTION (ML) INTRAVENOUS
Status: DISCONTINUED | OUTPATIENT
Start: 2022-07-25 | End: 2022-07-26 | Stop reason: HOSPADM

## 2022-07-25 RX ADMIN — SODIUM CHLORIDE, POTASSIUM CHLORIDE, SODIUM LACTATE AND CALCIUM CHLORIDE 125 ML/HR: 600; 310; 30; 20 INJECTION, SOLUTION INTRAVENOUS at 18:30

## 2022-07-25 RX ADMIN — Medication 2 MILLI-UNITS/MIN: at 22:06

## 2022-07-25 RX ADMIN — SODIUM CHLORIDE, POTASSIUM CHLORIDE, SODIUM LACTATE AND CALCIUM CHLORIDE 1000 ML: 600; 310; 30; 20 INJECTION, SOLUTION INTRAVENOUS at 18:30

## 2022-07-25 RX ADMIN — BUTORPHANOL TARTRATE 2 MG: 2 INJECTION, SOLUTION INTRAMUSCULAR; INTRAVENOUS at 23:52

## 2022-07-25 NOTE — PROGRESS NOTES
OB FOLLOW UP  CC- Here for care of pregnancy        Lorie Lara is a 36 y.o.  36w2d patient being seen today for her obstetrical follow up visit. Patient reports no complaints. GBS TDY?NKDA Pt feels well, good fm, occ ctx    Her prenatal care is complicated by (and status) : AMA  Patient Active Problem List   Diagnosis   • AMA in third trimester - normal cfDNA   • GDM (gestational diabetes mellitus), class A1   • Acquired hypothyroidism   • Gestational hypertension, third trimester - may represent undiagnosed cHTN   • Hypothyroidism complicating pregnancy, third trimester   • HTN (hypertension)   • Pregnancy   • History of gestational hypertension   • BMI 45.0-49.9, adult (HCC)   • Chronic hypertension with superimposed pre-eclampsia   • MISBAH (amniotic fluid index) borderline low   • Non-stress test nonreactive   • H/O fetal biophysical profile   • Decreased fetal movements in third trimester       GBS Status: Done Today. She is not allergic to PCN.    No Known Allergies       Flu Status: Already given in current flu season  Her Delivery Plan is: vaginal    US today: no    ROS -   Patient Reports : No Problems  Patient Denies: Loss of Fluid, Vaginal Spotting, Vision Changes and Headaches  Fetal Movement : normal  All other systems reviewed and are negative.       The additional following portions of the patient's history were reviewed and updated as appropriate: allergies, current medications, past family history, past medical history, past social history, past surgical history and problem list.    I have reviewed and agree with the HPI, ROS, and historical information as entered above. Christa Driver MD      EXAM:     Prenatal Vitals  BP: 130/90  Weight: 115 kg (254 lb)   Fetal Heart Rate: 130       Dilation/Effacement/Station  Dilation: 1  Effacement (%): 60  Station: -2      Urine Glucose Read-only: Negative  Urine Protein Read-only: Negative       Assessment and Plan    Problem List  Items Addressed This Visit            AMA in third trimester - normal cfDNA    GDM (gestational diabetes mellitus), class A1    Overview     NST starting at 36wk           Gestational hypertension, third trimester - may represent undiagnosed cHTN    Overview     22 - 24 hour urine = 344 mg/dL           Relevant Medications    NIFEdipine XL (ADALAT CC) 30 MG 24 hr tablet    Hypothyroidism complicating pregnancy, third trimester    Relevant Medications    levothyroxine (SYNTHROID, LEVOTHROID) 100 MCG tablet       Other    Pregnancy - Primary    Relevant Orders    POC Urinalysis Dipstick (Completed)    Fetal Nonstress Test    MISBAH (amniotic fluid index) borderline low    Relevant Orders    US Fetal Biophysical Profile;With Non-Stress Testing    Non-stress test nonreactive    H/O fetal biophysical profile    Decreased fetal movements in third trimester          1. Pregnancy at 36w2d  2. Nonreassuring fetal status in the setting of well-controlled gestational diabetes and superimposed preeclampsia.  I have discussed her case with Dr. Hutson as well and plan for delivery  3.   We have discussed possible outcomes at this gestation, status post  corticosteroids so that is in her favor.  We did discuss that sometimes babies will not tolerate labor at this point.  But we will plan for Downey bulb and low-dose Pitocin.  Christa Driver MD  2022

## 2022-07-26 ENCOUNTER — ANESTHESIA (OUTPATIENT)
Dept: LABOR AND DELIVERY | Facility: HOSPITAL | Age: 37
End: 2022-07-26

## 2022-07-26 ENCOUNTER — ANESTHESIA EVENT (OUTPATIENT)
Dept: LABOR AND DELIVERY | Facility: HOSPITAL | Age: 37
End: 2022-07-26

## 2022-07-26 LAB
GLUCOSE BLDC GLUCOMTR-MCNC: 101 MG/DL (ref 70–130)
GLUCOSE BLDC GLUCOMTR-MCNC: 84 MG/DL (ref 70–130)
GLUCOSE BLDC GLUCOMTR-MCNC: 91 MG/DL (ref 70–130)
GLUCOSE BLDC GLUCOMTR-MCNC: 93 MG/DL (ref 70–130)

## 2022-07-26 PROCEDURE — 59400 OBSTETRICAL CARE: CPT | Performed by: OBSTETRICS & GYNECOLOGY

## 2022-07-26 PROCEDURE — 25010000002 FENTANYL CITRATE (PF) 50 MCG/ML SOLUTION: Performed by: ANESTHESIOLOGY

## 2022-07-26 PROCEDURE — C1755 CATHETER, INTRASPINAL: HCPCS | Performed by: ANESTHESIOLOGY

## 2022-07-26 PROCEDURE — 0KQM0ZZ REPAIR PERINEUM MUSCLE, OPEN APPROACH: ICD-10-PCS | Performed by: OBSTETRICS & GYNECOLOGY

## 2022-07-26 PROCEDURE — 25010000002 ONDANSETRON PER 1 MG: Performed by: OBSTETRICS & GYNECOLOGY

## 2022-07-26 PROCEDURE — 82962 GLUCOSE BLOOD TEST: CPT

## 2022-07-26 PROCEDURE — 51703 INSERT BLADDER CATH COMPLEX: CPT

## 2022-07-26 PROCEDURE — C1755 CATHETER, INTRASPINAL: HCPCS

## 2022-07-26 PROCEDURE — 3E033VJ INTRODUCTION OF OTHER HORMONE INTO PERIPHERAL VEIN, PERCUTANEOUS APPROACH: ICD-10-PCS | Performed by: OBSTETRICS & GYNECOLOGY

## 2022-07-26 PROCEDURE — 25010000002 AMPICILLIN PER 500 MG: Performed by: OBSTETRICS & GYNECOLOGY

## 2022-07-26 PROCEDURE — 25010000002 ROPIVACAINE PER 1 MG: Performed by: ANESTHESIOLOGY

## 2022-07-26 RX ORDER — IBUPROFEN 600 MG/1
600 TABLET ORAL EVERY 6 HOURS SCHEDULED
Status: DISCONTINUED | OUTPATIENT
Start: 2022-07-27 | End: 2022-07-28 | Stop reason: HOSPADM

## 2022-07-26 RX ORDER — ROPIVACAINE HYDROCHLORIDE 2 MG/ML
15 INJECTION, SOLUTION EPIDURAL; INFILTRATION; PERINEURAL CONTINUOUS
Status: DISCONTINUED | OUTPATIENT
Start: 2022-07-26 | End: 2022-07-28 | Stop reason: HOSPADM

## 2022-07-26 RX ORDER — LEVOTHYROXINE SODIUM 0.1 MG/1
100 TABLET ORAL
Status: DISCONTINUED | OUTPATIENT
Start: 2022-07-26 | End: 2022-07-28 | Stop reason: HOSPADM

## 2022-07-26 RX ORDER — FAMOTIDINE 10 MG/ML
20 INJECTION, SOLUTION INTRAVENOUS ONCE AS NEEDED
Status: DISCONTINUED | OUTPATIENT
Start: 2022-07-26 | End: 2022-07-26 | Stop reason: HOSPADM

## 2022-07-26 RX ORDER — HYDROCORTISONE 25 MG/G
1 CREAM TOPICAL AS NEEDED
Status: DISCONTINUED | OUTPATIENT
Start: 2022-07-26 | End: 2022-07-28 | Stop reason: HOSPADM

## 2022-07-26 RX ORDER — OXYTOCIN/0.9 % SODIUM CHLORIDE 30/500 ML
650 PLASTIC BAG, INJECTION (ML) INTRAVENOUS ONCE
Status: DISCONTINUED | OUTPATIENT
Start: 2022-07-26 | End: 2022-07-26 | Stop reason: HOSPADM

## 2022-07-26 RX ORDER — IBUPROFEN 600 MG/1
600 TABLET ORAL EVERY 6 HOURS PRN
Status: DISCONTINUED | OUTPATIENT
Start: 2022-07-26 | End: 2022-07-26 | Stop reason: HOSPADM

## 2022-07-26 RX ORDER — MISOPROSTOL 200 UG/1
600 TABLET ORAL AS NEEDED
Status: DISCONTINUED | OUTPATIENT
Start: 2022-07-26 | End: 2022-07-28 | Stop reason: HOSPADM

## 2022-07-26 RX ORDER — METOCLOPRAMIDE HYDROCHLORIDE 5 MG/ML
10 INJECTION INTRAMUSCULAR; INTRAVENOUS ONCE AS NEEDED
Status: DISCONTINUED | OUTPATIENT
Start: 2022-07-26 | End: 2022-07-26 | Stop reason: HOSPADM

## 2022-07-26 RX ORDER — FENTANYL CITRATE 50 UG/ML
INJECTION, SOLUTION INTRAMUSCULAR; INTRAVENOUS AS NEEDED
Status: DISCONTINUED | OUTPATIENT
Start: 2022-07-26 | End: 2022-07-26 | Stop reason: SURG

## 2022-07-26 RX ORDER — DIPHENHYDRAMINE HYDROCHLORIDE 50 MG/ML
12.5 INJECTION INTRAMUSCULAR; INTRAVENOUS EVERY 8 HOURS PRN
Status: DISCONTINUED | OUTPATIENT
Start: 2022-07-26 | End: 2022-07-26 | Stop reason: HOSPADM

## 2022-07-26 RX ORDER — ONDANSETRON 2 MG/ML
4 INJECTION INTRAMUSCULAR; INTRAVENOUS ONCE AS NEEDED
Status: DISCONTINUED | OUTPATIENT
Start: 2022-07-26 | End: 2022-07-26 | Stop reason: HOSPADM

## 2022-07-26 RX ORDER — ACETAMINOPHEN 325 MG/1
650 TABLET ORAL EVERY 4 HOURS PRN
Status: DISCONTINUED | OUTPATIENT
Start: 2022-07-26 | End: 2022-07-26 | Stop reason: HOSPADM

## 2022-07-26 RX ORDER — EPHEDRINE SULFATE 5 MG/ML
10 INJECTION INTRAVENOUS
Status: DISCONTINUED | OUTPATIENT
Start: 2022-07-26 | End: 2022-07-26 | Stop reason: HOSPADM

## 2022-07-26 RX ORDER — LIDOCAINE HYDROCHLORIDE AND EPINEPHRINE 15; 5 MG/ML; UG/ML
INJECTION, SOLUTION EPIDURAL AS NEEDED
Status: DISCONTINUED | OUTPATIENT
Start: 2022-07-26 | End: 2022-07-26 | Stop reason: SURG

## 2022-07-26 RX ORDER — BUPIVACAINE HYDROCHLORIDE 2.5 MG/ML
INJECTION, SOLUTION EPIDURAL; INFILTRATION; INTRACAUDAL AS NEEDED
Status: DISCONTINUED | OUTPATIENT
Start: 2022-07-26 | End: 2022-07-26 | Stop reason: SURG

## 2022-07-26 RX ORDER — ASPIRIN 81 MG/1
81 TABLET, CHEWABLE ORAL NIGHTLY
Status: DISCONTINUED | OUTPATIENT
Start: 2022-07-26 | End: 2022-07-28 | Stop reason: HOSPADM

## 2022-07-26 RX ORDER — TRISODIUM CITRATE DIHYDRATE AND CITRIC ACID MONOHYDRATE 500; 334 MG/5ML; MG/5ML
30 SOLUTION ORAL ONCE
Status: DISCONTINUED | OUTPATIENT
Start: 2022-07-26 | End: 2022-07-26 | Stop reason: HOSPADM

## 2022-07-26 RX ORDER — MISOPROSTOL 200 UG/1
800 TABLET ORAL AS NEEDED
Status: DISCONTINUED | OUTPATIENT
Start: 2022-07-26 | End: 2022-07-26 | Stop reason: HOSPADM

## 2022-07-26 RX ORDER — DOCUSATE SODIUM 100 MG/1
100 CAPSULE, LIQUID FILLED ORAL 2 TIMES DAILY
Status: DISCONTINUED | OUTPATIENT
Start: 2022-07-26 | End: 2022-07-28 | Stop reason: HOSPADM

## 2022-07-26 RX ORDER — NIFEDIPINE 30 MG/1
30 TABLET, EXTENDED RELEASE ORAL EVERY 12 HOURS
Status: DISCONTINUED | OUTPATIENT
Start: 2022-07-26 | End: 2022-07-28 | Stop reason: HOSPADM

## 2022-07-26 RX ORDER — OXYTOCIN/0.9 % SODIUM CHLORIDE 30/500 ML
85 PLASTIC BAG, INJECTION (ML) INTRAVENOUS ONCE
Status: DISCONTINUED | OUTPATIENT
Start: 2022-07-26 | End: 2022-07-26 | Stop reason: HOSPADM

## 2022-07-26 RX ORDER — CARBOPROST TROMETHAMINE 250 UG/ML
250 INJECTION, SOLUTION INTRAMUSCULAR AS NEEDED
Status: DISCONTINUED | OUTPATIENT
Start: 2022-07-26 | End: 2022-07-26 | Stop reason: HOSPADM

## 2022-07-26 RX ORDER — BISACODYL 10 MG
10 SUPPOSITORY, RECTAL RECTAL DAILY PRN
Status: DISCONTINUED | OUTPATIENT
Start: 2022-07-27 | End: 2022-07-28 | Stop reason: HOSPADM

## 2022-07-26 RX ORDER — METHYLERGONOVINE MALEATE 0.2 MG/ML
200 INJECTION INTRAVENOUS ONCE AS NEEDED
Status: DISCONTINUED | OUTPATIENT
Start: 2022-07-26 | End: 2022-07-26 | Stop reason: HOSPADM

## 2022-07-26 RX ORDER — SODIUM CHLORIDE 0.9 % (FLUSH) 0.9 %
1-10 SYRINGE (ML) INJECTION AS NEEDED
Status: DISCONTINUED | OUTPATIENT
Start: 2022-07-26 | End: 2022-07-28 | Stop reason: HOSPADM

## 2022-07-26 RX ORDER — IBUPROFEN 600 MG/1
600 TABLET ORAL EVERY 6 HOURS SCHEDULED
Status: DISCONTINUED | OUTPATIENT
Start: 2022-07-26 | End: 2022-07-26

## 2022-07-26 RX ADMIN — DOCUSATE SODIUM 100 MG: 100 CAPSULE, LIQUID FILLED ORAL at 20:21

## 2022-07-26 RX ADMIN — FENTANYL CITRATE 100 MCG: 50 INJECTION, SOLUTION INTRAMUSCULAR; INTRAVENOUS at 01:47

## 2022-07-26 RX ADMIN — LIDOCAINE HYDROCHLORIDE AND EPINEPHRINE 2 ML: 15; 5 INJECTION, SOLUTION EPIDURAL at 01:46

## 2022-07-26 RX ADMIN — ONDANSETRON 4 MG: 2 INJECTION INTRAMUSCULAR; INTRAVENOUS at 07:49

## 2022-07-26 RX ADMIN — BUPIVACAINE HYDROCHLORIDE 12 ML: 2.5 INJECTION, SOLUTION EPIDURAL; INFILTRATION; INTRACAUDAL; PERINEURAL at 01:47

## 2022-07-26 RX ADMIN — HYDROCORTISONE 2.5% 1 APPLICATION: 25 CREAM TOPICAL at 16:13

## 2022-07-26 RX ADMIN — WITCH HAZEL 1 PAD: 500 SOLUTION RECTAL; TOPICAL at 16:13

## 2022-07-26 RX ADMIN — AMPICILLIN 1 G: 1 INJECTION, POWDER, FOR SOLUTION INTRAMUSCULAR; INTRAVENOUS at 11:01

## 2022-07-26 RX ADMIN — IBUPROFEN 600 MG: 600 TABLET ORAL at 16:13

## 2022-07-26 RX ADMIN — LEVOTHYROXINE SODIUM 100 MCG: 0.1 TABLET ORAL at 05:28

## 2022-07-26 RX ADMIN — NIFEDIPINE 30 MG: 30 TABLET, FILM COATED, EXTENDED RELEASE ORAL at 16:07

## 2022-07-26 RX ADMIN — LIDOCAINE HYDROCHLORIDE AND EPINEPHRINE 3 ML: 15; 5 INJECTION, SOLUTION EPIDURAL at 01:45

## 2022-07-26 RX ADMIN — ASPIRIN 81 MG CHEWABLE TABLET 81 MG: 81 TABLET CHEWABLE at 23:14

## 2022-07-26 RX ADMIN — ROPIVACAINE HYDROCHLORIDE 15 ML/HR: 2 INJECTION, SOLUTION EPIDURAL; INFILTRATION at 01:52

## 2022-07-26 RX ADMIN — Medication 1 APPLICATION: at 16:13

## 2022-07-26 RX ADMIN — SODIUM CHLORIDE, POTASSIUM CHLORIDE, SODIUM LACTATE AND CALCIUM CHLORIDE 1000 ML: 600; 310; 30; 20 INJECTION, SOLUTION INTRAVENOUS at 01:05

## 2022-07-26 RX ADMIN — ROPIVACAINE HYDROCHLORIDE 15 ML/HR: 2 INJECTION, SOLUTION EPIDURAL; INFILTRATION at 07:39

## 2022-07-26 RX ADMIN — SODIUM CHLORIDE, POTASSIUM CHLORIDE, SODIUM LACTATE AND CALCIUM CHLORIDE 125 ML/HR: 600; 310; 30; 20 INJECTION, SOLUTION INTRAVENOUS at 09:35

## 2022-07-26 RX ADMIN — Medication: at 16:13

## 2022-07-26 RX ADMIN — AMPICILLIN SODIUM 2 G: 2 INJECTION, POWDER, FOR SOLUTION INTRAVENOUS at 07:26

## 2022-07-26 NOTE — ANESTHESIA PREPROCEDURE EVALUATION
Anesthesia Evaluation     Patient summary reviewed and Nursing notes reviewed   NPO Solid Status: > 6 hours  NPO Liquid Status: < 2 hours           Airway   Mallampati: II  TM distance: >3 FB  Neck ROM: full  No difficulty expected  Dental      Pulmonary - negative pulmonary ROS   Cardiovascular - negative cardio ROS        Neuro/Psych  (+) psychiatric history Anxiety,    GI/Hepatic/Renal/Endo    (+) morbid obesity,  diabetes mellitus gestational well controlled, thyroid problem hypothyroidism    Musculoskeletal (-) negative ROS    Abdominal    Substance History - negative use     OB/GYN    (+) Pregnant, pregnancy induced hypertension        Other                      Anesthesia Plan    ASA 3     epidural       Anesthetic plan, risks, benefits, and alternatives have been provided, discussed and informed consent has been obtained with: patient.  Use of blood products discussed with patient .       CODE STATUS:

## 2022-07-26 NOTE — ANESTHESIA PROCEDURE NOTES
Labor Epidural      Patient reassessed immediately prior to procedure    Patient location during procedure: OB  Performed By  Anesthesiologist: Aaron Coleman DO  Preanesthetic Checklist  Completed: patient identified, IV checked, site marked, risks and benefits discussed, surgical consent, monitors and equipment checked, pre-op evaluation and timeout performed  Additional Notes  Dural puncture performed with 5 inch 25 g Kendall needle, clear CSF.  Prep:  Pt Position:sitting  Sterile Tech:gloves, mask, sterile barrier and cap  Prep:chlorhexidine gluconate and isopropyl alcohol  Monitoring:blood pressure monitoring and continuous pulse oximetry  Epidural Block Procedure:  Approach:midline  Guidance:landmark technique and palpation technique  Location:L3-L4  Needle Type:Tuohy  Needle Gauge:17 G  Loss of Resistance Medium: air  Loss of Resistance: 7cm  Cath Depth at skin:12 cm  Paresthesia: none  Aspiration:negative  Test Dose:negative  Number of Attempts: 1  Post Assessment:  Dressing:secured with tape and occlusive dressing applied (Tegaderm Placed)  Pt Tolerance:patient tolerated the procedure well with no apparent complications  Complications:no

## 2022-07-27 LAB
ALP SERPL-CCNC: 79 U/L (ref 39–117)
ALT SERPL W P-5'-P-CCNC: 8 U/L (ref 1–33)
AST SERPL-CCNC: 15 U/L (ref 1–32)
BASOPHILS # BLD AUTO: 0.02 10*3/MM3 (ref 0–0.2)
BASOPHILS NFR BLD AUTO: 0.2 % (ref 0–1.5)
BILIRUB SERPL-MCNC: <0.2 MG/DL (ref 0–1.2)
CREAT SERPL-MCNC: 0.58 MG/DL (ref 0.57–1)
DEPRECATED RDW RBC AUTO: 41.1 FL (ref 37–54)
EOSINOPHIL # BLD AUTO: 0.08 10*3/MM3 (ref 0–0.4)
EOSINOPHIL NFR BLD AUTO: 0.9 % (ref 0.3–6.2)
ERYTHROCYTE [DISTWIDTH] IN BLOOD BY AUTOMATED COUNT: 12.9 % (ref 12.3–15.4)
HCT VFR BLD AUTO: 28.5 % (ref 34–46.6)
HGB BLD-MCNC: 10.1 G/DL (ref 12–15.9)
IMM GRANULOCYTES # BLD AUTO: 0.06 10*3/MM3 (ref 0–0.05)
IMM GRANULOCYTES NFR BLD AUTO: 0.7 % (ref 0–0.5)
LDH SERPL-CCNC: 158 U/L (ref 135–214)
LYMPHOCYTES # BLD AUTO: 1.39 10*3/MM3 (ref 0.7–3.1)
LYMPHOCYTES NFR BLD AUTO: 15.2 % (ref 19.6–45.3)
MCH RBC QN AUTO: 31.3 PG (ref 26.6–33)
MCHC RBC AUTO-ENTMCNC: 35.4 G/DL (ref 31.5–35.7)
MCV RBC AUTO: 88.2 FL (ref 79–97)
MONOCYTES # BLD AUTO: 0.67 10*3/MM3 (ref 0.1–0.9)
MONOCYTES NFR BLD AUTO: 7.3 % (ref 5–12)
NEUTROPHILS NFR BLD AUTO: 6.92 10*3/MM3 (ref 1.7–7)
NEUTROPHILS NFR BLD AUTO: 75.7 % (ref 42.7–76)
NRBC BLD AUTO-RTO: 0 /100 WBC (ref 0–0.2)
PLATELET # BLD AUTO: 134 10*3/MM3 (ref 140–450)
PMV BLD AUTO: 9.4 FL (ref 6–12)
RBC # BLD AUTO: 3.23 10*6/MM3 (ref 3.77–5.28)
URATE SERPL-MCNC: 3.9 MG/DL (ref 2.4–5.7)
WBC NRBC COR # BLD: 9.14 10*3/MM3 (ref 3.4–10.8)

## 2022-07-27 PROCEDURE — 84450 TRANSFERASE (AST) (SGOT): CPT | Performed by: OBSTETRICS & GYNECOLOGY

## 2022-07-27 PROCEDURE — 84460 ALANINE AMINO (ALT) (SGPT): CPT | Performed by: OBSTETRICS & GYNECOLOGY

## 2022-07-27 PROCEDURE — 82247 BILIRUBIN TOTAL: CPT | Performed by: OBSTETRICS & GYNECOLOGY

## 2022-07-27 PROCEDURE — 0503F POSTPARTUM CARE VISIT: CPT

## 2022-07-27 PROCEDURE — 83615 LACTATE (LD) (LDH) ENZYME: CPT | Performed by: OBSTETRICS & GYNECOLOGY

## 2022-07-27 PROCEDURE — 84075 ASSAY ALKALINE PHOSPHATASE: CPT | Performed by: OBSTETRICS & GYNECOLOGY

## 2022-07-27 PROCEDURE — 84550 ASSAY OF BLOOD/URIC ACID: CPT | Performed by: OBSTETRICS & GYNECOLOGY

## 2022-07-27 PROCEDURE — 82565 ASSAY OF CREATININE: CPT | Performed by: OBSTETRICS & GYNECOLOGY

## 2022-07-27 PROCEDURE — 85025 COMPLETE CBC W/AUTO DIFF WBC: CPT | Performed by: OBSTETRICS & GYNECOLOGY

## 2022-07-27 RX ORDER — IBUPROFEN 600 MG/1
600 TABLET ORAL EVERY 6 HOURS PRN
Status: DISCONTINUED | OUTPATIENT
Start: 2022-07-27 | End: 2022-07-28 | Stop reason: HOSPADM

## 2022-07-27 RX ADMIN — IBUPROFEN 600 MG: 600 TABLET ORAL at 22:03

## 2022-07-27 RX ADMIN — IBUPROFEN 600 MG: 600 TABLET ORAL at 17:37

## 2022-07-27 RX ADMIN — IBUPROFEN 600 MG: 600 TABLET ORAL at 10:51

## 2022-07-27 RX ADMIN — DOCUSATE SODIUM 100 MG: 100 CAPSULE, LIQUID FILLED ORAL at 08:52

## 2022-07-27 RX ADMIN — LEVOTHYROXINE SODIUM 100 MCG: 0.1 TABLET ORAL at 06:11

## 2022-07-27 RX ADMIN — DOCUSATE SODIUM 100 MG: 100 CAPSULE, LIQUID FILLED ORAL at 20:33

## 2022-07-27 RX ADMIN — ASPIRIN 81 MG CHEWABLE TABLET 81 MG: 81 TABLET CHEWABLE at 20:33

## 2022-07-27 RX ADMIN — IBUPROFEN 600 MG: 600 TABLET ORAL at 04:43

## 2022-07-27 RX ADMIN — NIFEDIPINE 30 MG: 30 TABLET, FILM COATED, EXTENDED RELEASE ORAL at 08:52

## 2022-07-27 RX ADMIN — NIFEDIPINE 30 MG: 30 TABLET, FILM COATED, EXTENDED RELEASE ORAL at 20:33

## 2022-07-27 NOTE — ANESTHESIA POSTPROCEDURE EVALUATION
Patient: Lorie Lara    Procedure Summary     Date: 07/26/22 Room / Location:     Anesthesia Start: 0132 Anesthesia Stop: 1228    Procedure: LABOR ANALGESIA Diagnosis:     Scheduled Providers:  Provider: Aaron Coleman DO    Anesthesia Type: epidural ASA Status: 3          Anesthesia Type: epidural    Vitals  Vitals Value Taken Time   /98 07/27/22 1100   Temp 98 °F (36.7 °C) 07/27/22 1100   Pulse 81 07/27/22 1100   Resp 16 07/27/22 1100   SpO2 98 % 07/26/22 0231           Post Anesthesia Care and Evaluation    Patient location during evaluation: bedside  Patient participation: complete - patient participated  Level of consciousness: awake and alert  Pain management: adequate    Airway patency: patent  Anesthetic complications: No anesthetic complications    Cardiovascular status: acceptable  Respiratory status: acceptable  Hydration status: acceptable  Post Neuraxial Block status: Motor and sensory function returned to baseline and No signs or symptoms of PDPH

## 2022-07-28 VITALS
HEIGHT: 60 IN | OXYGEN SATURATION: 98 % | HEART RATE: 77 BPM | WEIGHT: 254 LBS | RESPIRATION RATE: 16 BRPM | BODY MASS INDEX: 49.87 KG/M2 | SYSTOLIC BLOOD PRESSURE: 122 MMHG | TEMPERATURE: 98.2 F | DIASTOLIC BLOOD PRESSURE: 78 MMHG

## 2022-07-28 PROCEDURE — 0503F POSTPARTUM CARE VISIT: CPT | Performed by: NURSE PRACTITIONER

## 2022-07-28 RX ORDER — IBUPROFEN 600 MG/1
600 TABLET ORAL EVERY 6 HOURS SCHEDULED
Qty: 30 TABLET | Refills: 0 | Status: SHIPPED | OUTPATIENT
Start: 2022-07-28

## 2022-07-28 RX ORDER — NIFEDIPINE 30 MG/1
30 TABLET, FILM COATED, EXTENDED RELEASE ORAL EVERY 12 HOURS
Qty: 60 TABLET | Refills: 12 | Status: SHIPPED | OUTPATIENT
Start: 2022-07-28

## 2022-07-28 RX ADMIN — NIFEDIPINE 30 MG: 30 TABLET, FILM COATED, EXTENDED RELEASE ORAL at 07:35

## 2022-07-28 RX ADMIN — IBUPROFEN 600 MG: 600 TABLET ORAL at 04:27

## 2022-07-28 RX ADMIN — IBUPROFEN 600 MG: 600 TABLET ORAL at 10:10

## 2022-07-28 RX ADMIN — IBUPROFEN 600 MG: 600 TABLET ORAL at 15:40

## 2022-07-28 RX ADMIN — LEVOTHYROXINE SODIUM 100 MCG: 0.1 TABLET ORAL at 06:31

## 2022-07-28 RX ADMIN — DOCUSATE SODIUM 100 MG: 100 CAPSULE, LIQUID FILLED ORAL at 07:35

## 2022-07-29 ENCOUNTER — APPOINTMENT (OUTPATIENT)
Dept: PREADMISSION TESTING | Facility: HOSPITAL | Age: 37
End: 2022-07-29

## 2022-07-29 ENCOUNTER — APPOINTMENT (OUTPATIENT)
Dept: WOMENS IMAGING | Facility: HOSPITAL | Age: 37
End: 2022-07-29

## 2022-08-02 ENCOUNTER — POSTPARTUM VISIT (OUTPATIENT)
Dept: OBSTETRICS AND GYNECOLOGY | Facility: CLINIC | Age: 37
End: 2022-08-02

## 2022-08-02 VITALS — DIASTOLIC BLOOD PRESSURE: 90 MMHG | SYSTOLIC BLOOD PRESSURE: 130 MMHG

## 2022-08-02 PROBLEM — O36.8130 DECREASED FETAL MOVEMENTS IN THIRD TRIMESTER: Status: RESOLVED | Noted: 2022-07-25 | Resolved: 2022-08-02

## 2022-08-02 PROBLEM — O28.8 NON-STRESS TEST NONREACTIVE: Status: RESOLVED | Noted: 2022-07-25 | Resolved: 2022-08-02

## 2022-08-02 PROBLEM — Z92.89 H/O FETAL BIOPHYSICAL PROFILE: Status: RESOLVED | Noted: 2022-07-25 | Resolved: 2022-08-02

## 2022-08-02 PROCEDURE — 0503F POSTPARTUM CARE VISIT: CPT | Performed by: NURSE PRACTITIONER

## 2022-08-02 NOTE — PROGRESS NOTES
Chief Complaint   Patient presents with   • Postpartum Care       Postpartum blood pressure check visit         Lorie Lara is a 36 y.o.  who presents today for a blood pressure check.    Vaginal, Spontaneous    Information for the patient's :  Fanny Lara [[id number]]   2022   female   Nelida  3170 g (6 lb 15.8 oz)   Gestational Age: 36w3d      Baby Discharged: Discharged with Mom  Delivering Physician: Christa Driver MD    The patient has CHTN.  She started BP meds at 32 wks and is now taking nifedipine 30 mg bid. She has not been monitoring since discharge from the hospital.  The patient denies headache, right upper quadrant/ epigastric pain, vision changes, and swelling.     Patient describes her vaginal bleeding as light.  Patient is breast feeding, and denies issues with her breast. She denies bowel or bladder issues.    Patient denies postpartum depression. Postpartum Depression Screening Questionnaire: Was completed and she scored a 1, no treatment indicated.      The additional following portions of the patient's history were reviewed and updated as appropriate: allergies, current medications, past family history, past medical history, past social history, past surgical history and problem list.      Review of Systems  All other systems reviewed and are negative.     I have reviewed and agree with the HPI, ROS, and historical information as entered above. Faith Shelton, APRN    Objective   /90   LMP 2021 (Exact Date)   Breastfeeding Yes     Physical Exam  Vitals and nursing note reviewed.   Constitutional:       General: She is not in acute distress.     Appearance: Normal appearance. She is not ill-appearing.   Pulmonary:      Effort: Pulmonary effort is normal.   Abdominal:      General: There is no distension.      Palpations: Abdomen is soft. There is no mass.      Tenderness: There is no abdominal tenderness. There is no guarding or  rebound.      Hernia: No hernia is present.   Skin:     General: Skin is warm and dry.   Neurological:      Mental Status: She is alert and oriented to person, place, and time.   Psychiatric:         Mood and Affect: Mood normal.         Behavior: Behavior normal.              Assessment and Plan    Problem List Items Addressed This Visit    None     Visit Diagnoses     Postpartum follow-up    -  Primary          1. S/p Vaginal delivery   2. Continued pelvic rest.   3. BP wnl now; asymptomatic.  Advised pt to check BP at home.  If wnl, decrease Procardia 30 XL to qd.  Can slowly wean.  If dropping too low, let us know.  Return in about 5 weeks (around 9/6/2022) for pp.    Faith Shelton, APRN  08/02/2022

## 2022-08-29 DIAGNOSIS — E07.9 THYROID DISORDER: ICD-10-CM

## 2022-08-29 RX ORDER — LEVOTHYROXINE SODIUM 0.1 MG/1
100 TABLET ORAL DAILY
Qty: 30 TABLET | Refills: 0 | Status: SHIPPED | OUTPATIENT
Start: 2022-08-29 | End: 2022-10-25 | Stop reason: SDUPTHER

## 2022-08-29 RX ORDER — NIFEDIPINE 30 MG/1
30 TABLET, FILM COATED, EXTENDED RELEASE ORAL EVERY 12 HOURS
Qty: 60 TABLET | Refills: 12 | OUTPATIENT
Start: 2022-08-29

## 2022-09-13 ENCOUNTER — POSTPARTUM VISIT (OUTPATIENT)
Dept: OBSTETRICS AND GYNECOLOGY | Facility: CLINIC | Age: 37
End: 2022-09-13

## 2022-09-13 VITALS
SYSTOLIC BLOOD PRESSURE: 128 MMHG | WEIGHT: 249 LBS | HEIGHT: 60 IN | DIASTOLIC BLOOD PRESSURE: 90 MMHG | BODY MASS INDEX: 48.88 KG/M2

## 2022-09-13 DIAGNOSIS — O09.523 MULTIGRAVIDA OF ADVANCED MATERNAL AGE IN THIRD TRIMESTER: Primary | ICD-10-CM

## 2022-09-13 DIAGNOSIS — O13.3 GESTATIONAL HYPERTENSION, THIRD TRIMESTER: ICD-10-CM

## 2022-09-13 DIAGNOSIS — O24.410 GDM (GESTATIONAL DIABETES MELLITUS), CLASS A1: ICD-10-CM

## 2022-09-13 DIAGNOSIS — E03.9 HYPOTHYROIDISM COMPLICATING PREGNANCY, THIRD TRIMESTER: ICD-10-CM

## 2022-09-13 DIAGNOSIS — O99.283 HYPOTHYROIDISM COMPLICATING PREGNANCY, THIRD TRIMESTER: ICD-10-CM

## 2022-09-13 PROCEDURE — 0503F POSTPARTUM CARE VISIT: CPT | Performed by: OBSTETRICS & GYNECOLOGY

## 2022-10-25 DIAGNOSIS — E07.9 THYROID DISORDER: ICD-10-CM

## 2022-10-25 RX ORDER — LEVOTHYROXINE SODIUM 0.1 MG/1
100 TABLET ORAL DAILY
Qty: 30 TABLET | Refills: 0 | Status: SHIPPED | OUTPATIENT
Start: 2022-10-25 | End: 2022-11-23 | Stop reason: SDUPTHER

## 2022-11-21 ENCOUNTER — OFFICE VISIT (OUTPATIENT)
Dept: INTERNAL MEDICINE | Facility: CLINIC | Age: 37
End: 2022-11-21

## 2022-11-21 VITALS
DIASTOLIC BLOOD PRESSURE: 96 MMHG | BODY MASS INDEX: 52.22 KG/M2 | OXYGEN SATURATION: 99 % | TEMPERATURE: 97.6 F | WEIGHT: 266 LBS | HEART RATE: 107 BPM | HEIGHT: 60 IN | SYSTOLIC BLOOD PRESSURE: 138 MMHG

## 2022-11-21 DIAGNOSIS — Z86.32 HISTORY OF GESTATIONAL DIABETES: Primary | ICD-10-CM

## 2022-11-21 DIAGNOSIS — E07.9 THYROID DISORDER: ICD-10-CM

## 2022-11-21 PROCEDURE — 99214 OFFICE O/P EST MOD 30 MIN: CPT | Performed by: NURSE PRACTITIONER

## 2022-11-21 RX ORDER — LEVOTHYROXINE SODIUM 0.1 MG/1
100 TABLET ORAL DAILY
Qty: 30 TABLET | Refills: 0 | Status: CANCELLED | OUTPATIENT
Start: 2022-11-21

## 2022-11-21 NOTE — PROGRESS NOTES
"     Office Visit      Patient Name: Lorie Lara  : 1985   MRN: [id number]     Chief Complaint:    Chief Complaint   Patient presents with   • Hypothyroidism       History of Present Illness: Lorie Lara is a 37 y.o. female who is here today for refill of levothyroxine. She is currently taking 100 mcg daily.  Last checked 5 months ago.  Denies abnormal fatigue, weight change, temperature intolerance, hair/skin/nail changes, bowel habit changes, palpitations, anxiety, sore throat, hoarseness.   H/O gestational diabetes.  Delivered 22.  Hyperglycemia managed with diet during pregnancy.  Patient denies paresthesia of the feet, polydipsia, polyuria, polyphagia, visual disturbances.      Subjective      I have reviewed and the following portions of the patient's history were updated as appropriate: past family history, past medical history, past social history, past surgical history and problem list.      Current Outpatient Medications:   •  ibuprofen (ADVIL,MOTRIN) 600 MG tablet, Take 1 tablet by mouth Every 6 (Six) Hours., Disp: 30 tablet, Rfl: 0  •  NIFEdipine XL (ADALAT CC) 30 MG 24 hr tablet, Take 1 tablet by mouth Every 12 (Twelve) Hours., Disp: 60 tablet, Rfl: 12  •  Prenatal Multivit-Min-Fe-FA (PRENATAL 1 + IRON PO), Take  by mouth., Disp: , Rfl:   •  levothyroxine (SYNTHROID, LEVOTHROID) 100 MCG tablet, Take 1 tablet by mouth Daily., Disp: 90 tablet, Rfl: 3    No Known Allergies    Objective     Physical Exam:  Vital Signs:   Vitals:    22 1353   BP: 138/96   Pulse: 107   Temp: 97.6 °F (36.4 °C)   SpO2: 99%   Weight: 121 kg (266 lb)   Height: 152.4 cm (60\")     Body mass index is 51.95 kg/m².    Physical Exam  Constitutional:       Appearance: She is morbidly obese. She is not ill-appearing.   HENT:      Head: Normocephalic.      Right Ear: External ear normal.      Left Ear: External ear normal.   Eyes:      Conjunctiva/sclera: Conjunctivae normal.      Pupils: Pupils " are equal, round, and reactive to light.   Cardiovascular:      Rate and Rhythm: Normal rate and regular rhythm.      Pulses:           Radial pulses are 2+ on the right side and 2+ on the left side.        Dorsalis pedis pulses are 2+ on the right side and 2+ on the left side.      Heart sounds: Normal heart sounds.   Pulmonary:      Effort: Pulmonary effort is normal.      Breath sounds: Normal breath sounds.   Musculoskeletal:      Cervical back: Normal range of motion and neck supple.      Right lower leg: No edema.      Left lower leg: No edema.   Skin:     General: Skin is warm.      Capillary Refill: Capillary refill takes less than 2 seconds.   Neurological:      Mental Status: She is alert and oriented to person, place, and time.      Coordination: Coordination normal.      Gait: Gait normal.   Psychiatric:         Mood and Affect: Mood normal.         Behavior: Behavior normal.         Thought Content: Thought content normal.             Assessment / Plan      Assessment/Plan:   Diagnoses and all orders for this visit:    1. History of gestational diabetes (Primary)  -     Hemoglobin A1c  - Continue eating heart healthy, low carb diet.  Engage in daily physical activity.  Losing weight will reduce risk of developing T2DM.     2. Thyroid disorder  -     TSH  -     T4, Free             Follow Up:   Return for Annual.    Patient was given instructions and counseling regarding her condition or for health maintenance advice. Please see specific information pulled into the AVS if appropriate.       [facility] [address] [facility]     Please note that portions of this note may have been completed with a voice recognition program. Efforts were made to edit dictation, but occasionally words are mistranscribed.

## 2022-11-22 LAB
HBA1C MFR BLD: 5.8 % (ref 4.8–5.6)
T4 FREE SERPL-MCNC: 1.11 NG/DL (ref 0.93–1.7)
TSH SERPL DL<=0.005 MIU/L-ACNC: 1.56 UIU/ML (ref 0.27–4.2)

## 2022-11-23 DIAGNOSIS — E07.9 THYROID DISORDER: ICD-10-CM

## 2022-11-23 RX ORDER — LEVOTHYROXINE SODIUM 0.1 MG/1
100 TABLET ORAL DAILY
Qty: 90 TABLET | Refills: 3 | Status: SHIPPED | OUTPATIENT
Start: 2022-11-23

## 2023-11-04 DIAGNOSIS — E07.9 THYROID DISORDER: ICD-10-CM

## 2023-11-06 RX ORDER — LEVOTHYROXINE SODIUM 0.1 MG/1
100 TABLET ORAL DAILY
Qty: 90 TABLET | Refills: 0 | Status: SHIPPED | OUTPATIENT
Start: 2023-11-06

## 2023-11-20 DIAGNOSIS — E07.9 THYROID DISORDER: ICD-10-CM

## 2023-11-20 RX ORDER — LEVOTHYROXINE SODIUM 0.1 MG/1
100 TABLET ORAL DAILY
Qty: 90 TABLET | Refills: 0 | OUTPATIENT
Start: 2023-11-20

## 2024-01-20 ENCOUNTER — OFFICE VISIT (OUTPATIENT)
Dept: INTERNAL MEDICINE | Facility: CLINIC | Age: 39
End: 2024-01-20
Payer: COMMERCIAL

## 2024-01-20 VITALS
HEIGHT: 60 IN | SYSTOLIC BLOOD PRESSURE: 131 MMHG | HEART RATE: 91 BPM | WEIGHT: 248.8 LBS | OXYGEN SATURATION: 96 % | DIASTOLIC BLOOD PRESSURE: 97 MMHG | BODY MASS INDEX: 48.84 KG/M2 | TEMPERATURE: 98.4 F

## 2024-01-20 DIAGNOSIS — Z13.0 SCREENING FOR DISORDER OF BLOOD AND BLOOD-FORMING ORGANS: ICD-10-CM

## 2024-01-20 DIAGNOSIS — E03.9 ACQUIRED HYPOTHYROIDISM: ICD-10-CM

## 2024-01-20 DIAGNOSIS — Z86.32 HISTORY OF GESTATIONAL DIABETES: ICD-10-CM

## 2024-01-20 DIAGNOSIS — Z00.00 ENCOUNTER FOR ANNUAL PHYSICAL EXAMINATION EXCLUDING GYNECOLOGICAL EXAMINATION IN A PATIENT OLDER THAN 17 YEARS: Primary | ICD-10-CM

## 2024-01-20 DIAGNOSIS — I10 PRIMARY HYPERTENSION: ICD-10-CM

## 2024-01-20 DIAGNOSIS — Z13.220 SCREENING FOR LIPID DISORDERS: ICD-10-CM

## 2024-01-20 PROCEDURE — 99395 PREV VISIT EST AGE 18-39: CPT | Performed by: NURSE PRACTITIONER

## 2024-01-20 RX ORDER — DIPHENOXYLATE HYDROCHLORIDE AND ATROPINE SULFATE 2.5; .025 MG/1; MG/1
1 TABLET ORAL DAILY
COMMUNITY

## 2024-01-20 NOTE — PROGRESS NOTES
Chief Complaint   Patient presents with    Annual Exam    Med Management       Lorie Lara is a 38 y.o. female and is here for a comprehensive physical exam.        History:  LMP:  2024  Menarche: 13 years old, 1998  Sexual activity:  yes, monogamous with her . He is scheduled to have a vasectomy next week.    Last pap date: 1.5 years ago. Next is scheduled for 2024.  Abnormal pap? No  : 2  Para: 2     Do you take any herbs or supplements that were not prescribed by a doctor? Yes, MVN    Health Habits:  Dental Exam. up to date  Eye Exam. up to date  Exercise: 0 times/week.  Current exercise activities include: none    Her last hemoglobin A1c was checked on 2022 and she would like to have another check done. Gestation diabetes.  She is eating healthily for the most part. She eats fresh fruits and vegetables and limits carbs. Her diet is a typical American diet. She has not been anemic with pregnancy.    HTN during pregnancy.  BP is elevated today.  Not currently taking antihypertensive medication.  Denies chest pain, dyspnea, orthopnea, palpitations, lower extremity edema, confusion, headaches, weakness, visual disturbances.    Health Maintenance   Topic Date Due    Hepatitis B (1 of 3 - 3-dose series) Never done    BMI FOLLOWUP  06/10/2023    COVID-19 Vaccine (2023- season) 2023    INFLUENZA VACCINE  2024 (Originally 2023)    PAP SMEAR  2024    ANNUAL PHYSICAL  2025    TDAP/TD VACCINES (5 - Td or Tdap) 2032    HEPATITIS C SCREENING  Completed    Pneumococcal Vaccine 0-64  Aged Out       PMH, PSH, SocHx, FamHx, Allergies, and Medications: Reviewed and updated in the Visit Navigator.     No Known Allergies  Past Medical History:   Diagnosis Date    Acquired hypothyroidism     Anxiety     Chronic hypertension with superimposed pre-eclampsia 2022    GDM (gestational diabetes mellitus), class A1 2022    History of  "gestational diabetes 2019    G1    History of gestational hypertension 2019    G1    History of varicella 1990    Obesity     Pregnancy 2022     Past Surgical History:   Procedure Laterality Date    INGUINAL HERNIA REPAIR Right 2006    TONSILLECTOMY  2002     Social History     Socioeconomic History    Marital status:    Tobacco Use    Smoking status: Former     Packs/day: 1.00     Years: 12.00     Additional pack years: 0.00     Total pack years: 12.00     Types: Cigarettes     Start date:      Quit date: 2013     Years since quittin.0    Smokeless tobacco: Never   Vaping Use    Vaping Use: Never used   Substance and Sexual Activity    Alcohol use: Yes     Alcohol/week: 3.0 standard drinks of alcohol     Types: 3 Glasses of wine per week     Comment: SOCIAL    Drug use: Not Currently     Frequency: 5.0 times per week     Types: Marijuana     Comment: Use at night to relax, help sleep, also with nausea. Have X.    Sexual activity: Yes     Partners: Male     Birth control/protection: None     Family History   Problem Relation Age of Onset    Hypertension Mother     Thyroid disease Mother     Arthritis Brother     Cancer Other     Melanoma Maternal Grandfather     Obesity Paternal Grandmother     Breast cancer Paternal Aunt         Just learned last year. Dont know when.       Review of Systems  Review of Systems   All other systems reviewed and are negative.      Objective   /97   Pulse 91   Temp 98.4 °F (36.9 °C)   Ht 152.4 cm (60\")   Wt 113 kg (248 lb 12.8 oz)   SpO2 96%   Breastfeeding No   BMI 48.59 kg/m²   Body mass index is 48.59 kg/m².    Physical Exam  Constitutional:       Appearance: She is well-developed. She is morbidly obese. She is not ill-appearing.   HENT:      Head: Normocephalic.      Right Ear: Tympanic membrane, ear canal and external ear normal.      Left Ear: Tympanic membrane, ear canal and external ear normal.      Nose: Nose normal.      Mouth/Throat: "      Mouth: Mucous membranes are moist.      Pharynx: Oropharynx is clear. Uvula midline.   Eyes:      Extraocular Movements: Extraocular movements intact.      Conjunctiva/sclera: Conjunctivae normal.      Pupils: Pupils are equal, round, and reactive to light.   Neck:      Thyroid: No thyromegaly.   Cardiovascular:      Rate and Rhythm: Normal rate and regular rhythm.      Pulses:           Radial pulses are 2+ on the right side and 2+ on the left side.        Dorsalis pedis pulses are 2+ on the right side and 2+ on the left side.      Heart sounds: Normal heart sounds.   Pulmonary:      Effort: Pulmonary effort is normal.      Breath sounds: Normal breath sounds.   Abdominal:      General: Bowel sounds are normal.      Palpations: Abdomen is soft.      Tenderness: There is no abdominal tenderness.   Musculoskeletal:         General: No tenderness or deformity. Normal range of motion.      Cervical back: Full passive range of motion without pain, normal range of motion and neck supple.      Right lower leg: No edema.      Left lower leg: No edema.   Lymphadenopathy:      Cervical: No cervical adenopathy.   Skin:     General: Skin is warm.      Capillary Refill: Capillary refill takes less than 2 seconds.   Neurological:      Mental Status: She is alert and oriented to person, place, and time.      Sensory: No sensory deficit.      Coordination: Coordination normal.      Gait: Gait normal.      Comments: CN II-XII normal   Psychiatric:         Attention and Perception: Attention normal.         Mood and Affect: Mood and affect normal.         Speech: Speech normal.         Behavior: Behavior normal.         Thought Content: Thought content normal.           Assessment & Plan   1. Healthy female exam.    2. Patient Counseling: Including but not Limited to the following, when appropriate:  --Nutrition: Stressed importance of moderation in sodium/caffeine intake, saturated fat and cholesterol, caloric balance,  sufficient intake of fresh fruits, vegetables, fiber, calcium, iron, and 1 mg of folate supplement per day (for females capable of pregnancy).  --Exercise: Stressed the importance of regular exercise.   --Substance Abuse: Discussed cessation/primary prevention of tobacco, alcohol, or other drug use; driving or other dangerous activities under the influence; availability of treatment for abuse, as indicated based on social history.    --Sexuality: Discussed sexually transmitted diseases, partner selection, use of condoms, avoidance of unintended pregnancy  and contraceptive alternatives.   --Injury prevention: Discussed safety belts, safety helmets, smoke detector, smoking near bedding or upholstery.   --Dental health: Discussed importance of regular tooth brushing, flossing, and dental visits.  --Immunizations reviewed.  3. Discussed the patient's BMI with her.  The BMI is above average; BMI management plan is completed  4. Return in about 1 year (around 1/20/2025) for Annual.  5. Age-appropriate Screening Scheduled    Assessment & Plan     Diagnoses and all orders for this visit:    1. Encounter for annual physical examination excluding gynecological examination in a patient older than 17 years (Primary)    2. Acquired hypothyroidism  -     levothyroxine (SYNTHROID, LEVOTHROID) 100 MCG tablet; Take 1 tablet by mouth Daily.  Dispense: 90 tablet; Refill: 3  -     T4, Free  -     TSH    3. Primary hypertension  -     Comprehensive Metabolic Panel  - Will monitor BP at home. If it continues to be > 130 / > 80, will return to clinic.  Is aware of adverse effects from uncontrolled HTN.         - Follow heart healthy diet.  Keep sodium intake < 1500 mg per day.  Avoid processed & fast foods.          - Exercise as tolerated, with a goal of 30 minutes of moderate exercise most days.     4. BMI 45.0-49.9, adult  -     Vitamin D,25-Hydroxy  - Class 3 Severe Obesity (BMI >=40). Obesity-related health conditions include the  following: hypertension. Obesity is unchanged. BMI is is above average; BMI management plan is completed. We discussed low calorie, low carb based diet program, portion control, increasing exercise, and pharmacologic options including contrave, GLP 1 .     5. Screening for lipid disorders  -     Lipid Panel    6. Screening for disorder of blood and blood-forming organs  -     CBC & Differential    7. History of gestational diabetes  -     Hemoglobin A1c                 Transcribed from ambient dictation for TITO Faulkner by Erick Sarkar.  01/22/24   09:30 EST    Patient or patient representative verbalized consent to the visit recording.  I have personally performed the services described in this document as transcribed by the above individual, and it is both accurate and complete.

## 2024-01-20 NOTE — PROGRESS NOTES
"     Office Visit      Patient Name: Lorie Lara  : 1985   MRN: [id number]     Chief Complaint:    Chief Complaint   Patient presents with    Annual Exam    Med Management       History of Present Illness: Lorie Lara is a 38 y.o. female who is here today     Subjective      I have reviewed and the following portions of the patient's history were updated as appropriate: past family history, past medical history, past social history, past surgical history and problem list.      Current Outpatient Medications:     levothyroxine (SYNTHROID, LEVOTHROID) 100 MCG tablet, Take 1 tablet by mouth Daily. PT NEEDS TO SCHEDULE APPOINTMENT FOR A PHYSICAL FOR  FURTHER REFILLS, Disp: 90 tablet, Rfl: 0    multivitamin (MULTIVITAMIN PO), Take 1 tablet by mouth Daily., Disp: , Rfl:     NIFEdipine XL (ADALAT CC) 30 MG 24 hr tablet, Take 1 tablet by mouth Every 12 (Twelve) Hours. (Patient not taking: Reported on 2024), Disp: 60 tablet, Rfl: 12    No Known Allergies    Objective     Physical Exam:  Vital Signs:   Vitals:    24 1000   BP: 151/100  Comment: Patient reports not taking BP med.   Pulse: 91   Temp: 98.4 °F (36.9 °C)   SpO2: 96%   Weight: 113 kg (248 lb 12.8 oz)   Height: 152.4 cm (60\")     Body mass index is 48.59 kg/m².  {Class 3 Severe Obesity (BMI >=40). (Optional):09830}       Physical Exam        Assessment / Plan      Assessment/Plan:   Diagnoses and all orders for this visit:    1. Thyroid disorder             Follow Up:   No follow-ups on file.    Patient was given instructions and counseling regarding her condition or for health maintenance advice. Please see specific information pulled into the AVS if appropriate.       [facility]     Please note that portions of this note may have been completed with a voice recognition program. Efforts were made to edit dictation, but occasionally words are mistranscribed.   "

## 2024-02-03 RX ORDER — LEVOTHYROXINE SODIUM 0.1 MG/1
100 TABLET ORAL DAILY
Qty: 90 TABLET | Refills: 3 | Status: SHIPPED | OUTPATIENT
Start: 2024-02-03

## 2024-02-23 ENCOUNTER — OFFICE VISIT (OUTPATIENT)
Dept: INTERNAL MEDICINE | Facility: CLINIC | Age: 39
End: 2024-02-23
Payer: COMMERCIAL

## 2024-02-23 ENCOUNTER — HOSPITAL ENCOUNTER (OUTPATIENT)
Dept: GENERAL RADIOLOGY | Facility: HOSPITAL | Age: 39
Discharge: HOME | End: 2024-02-23
Payer: COMMERCIAL

## 2024-02-23 ENCOUNTER — HOSPITAL ENCOUNTER (OUTPATIENT)
Dept: ULTRASOUND IMAGING | Facility: HOSPITAL | Age: 39
Discharge: HOME | End: 2024-02-23
Payer: COMMERCIAL

## 2024-02-23 VITALS
DIASTOLIC BLOOD PRESSURE: 88 MMHG | HEIGHT: 60 IN | OXYGEN SATURATION: 99 % | TEMPERATURE: 97.7 F | SYSTOLIC BLOOD PRESSURE: 150 MMHG | BODY MASS INDEX: 49.28 KG/M2 | WEIGHT: 251 LBS | HEART RATE: 102 BPM

## 2024-02-23 DIAGNOSIS — M79.89 SWELLING OF RIGHT FOOT: ICD-10-CM

## 2024-02-23 DIAGNOSIS — M79.671 RIGHT FOOT PAIN: ICD-10-CM

## 2024-02-23 LAB
25(OH)D3+25(OH)D2 SERPL-MCNC: 23.5 NG/ML (ref 30–100)
ALBUMIN SERPL-MCNC: 4.1 G/DL (ref 3.5–5.2)
ALBUMIN/GLOB SERPL: 1.7 G/DL
ALP SERPL-CCNC: 86 U/L (ref 39–117)
ALT SERPL-CCNC: 13 U/L (ref 1–33)
AST SERPL-CCNC: 13 U/L (ref 1–32)
BASOPHILS # BLD AUTO: 0.03 10*3/MM3 (ref 0–0.2)
BASOPHILS NFR BLD AUTO: 0.4 % (ref 0–1.5)
BILIRUB SERPL-MCNC: 0.3 MG/DL (ref 0–1.2)
BUN SERPL-MCNC: 16 MG/DL (ref 6–20)
BUN/CREAT SERPL: 22.9 (ref 7–25)
CALCIUM SERPL-MCNC: 8.9 MG/DL (ref 8.6–10.5)
CHLORIDE SERPL-SCNC: 103 MMOL/L (ref 98–107)
CHOLEST SERPL-MCNC: 156 MG/DL (ref 0–200)
CO2 SERPL-SCNC: 23.8 MMOL/L (ref 22–29)
CREAT SERPL-MCNC: 0.7 MG/DL (ref 0.57–1)
EGFRCR SERPLBLD CKD-EPI 2021: 114.4 ML/MIN/1.73
EOSINOPHIL # BLD AUTO: 0.12 10*3/MM3 (ref 0–0.4)
EOSINOPHIL NFR BLD AUTO: 1.7 % (ref 0.3–6.2)
ERYTHROCYTE [DISTWIDTH] IN BLOOD BY AUTOMATED COUNT: 12.9 % (ref 12.3–15.4)
GLOBULIN SER CALC-MCNC: 2.4 GM/DL
GLUCOSE SERPL-MCNC: 99 MG/DL (ref 65–99)
HBA1C MFR BLD: 5.8 % (ref 4.8–5.6)
HCT VFR BLD AUTO: 42.5 % (ref 34–46.6)
HDLC SERPL-MCNC: 49 MG/DL (ref 40–60)
HGB BLD-MCNC: 14.2 G/DL (ref 12–15.9)
IMM GRANULOCYTES # BLD AUTO: 0.03 10*3/MM3 (ref 0–0.05)
IMM GRANULOCYTES NFR BLD AUTO: 0.4 % (ref 0–0.5)
LDLC SERPL CALC-MCNC: 86 MG/DL (ref 0–100)
LYMPHOCYTES # BLD AUTO: 2 10*3/MM3 (ref 0.7–3.1)
LYMPHOCYTES NFR BLD AUTO: 27.9 % (ref 19.6–45.3)
MCH RBC QN AUTO: 29.2 PG (ref 26.6–33)
MCHC RBC AUTO-ENTMCNC: 33.4 G/DL (ref 31.5–35.7)
MCV RBC AUTO: 87.4 FL (ref 79–97)
MONOCYTES # BLD AUTO: 0.49 10*3/MM3 (ref 0.1–0.9)
MONOCYTES NFR BLD AUTO: 6.8 % (ref 5–12)
NEUTROPHILS # BLD AUTO: 4.5 10*3/MM3 (ref 1.7–7)
NEUTROPHILS NFR BLD AUTO: 62.8 % (ref 42.7–76)
NRBC BLD AUTO-RTO: 0 /100 WBC (ref 0–0.2)
PLATELET # BLD AUTO: 223 10*3/MM3 (ref 140–450)
POTASSIUM SERPL-SCNC: 4.6 MMOL/L (ref 3.5–5.2)
PROT SERPL-MCNC: 6.5 G/DL (ref 6–8.5)
RBC # BLD AUTO: 4.86 10*6/MM3 (ref 3.77–5.28)
SODIUM SERPL-SCNC: 138 MMOL/L (ref 136–145)
T4 FREE SERPL-MCNC: 1.31 NG/DL (ref 0.93–1.7)
TRIGL SERPL-MCNC: 115 MG/DL (ref 0–150)
TSH SERPL DL<=0.005 MIU/L-ACNC: 2.31 UIU/ML (ref 0.27–4.2)
VLDLC SERPL CALC-MCNC: 21 MG/DL (ref 5–40)
WBC # BLD AUTO: 7.17 10*3/MM3 (ref 3.4–10.8)

## 2024-02-23 PROCEDURE — 93971 EXTREMITY STUDY: CPT

## 2024-02-23 PROCEDURE — 73630 X-RAY EXAM OF FOOT: CPT

## 2024-02-23 PROCEDURE — 99214 OFFICE O/P EST MOD 30 MIN: CPT | Performed by: STUDENT IN AN ORGANIZED HEALTH CARE EDUCATION/TRAINING PROGRAM

## 2024-02-23 NOTE — PROGRESS NOTES
Subjective   Lorie Lara is a 38 y.o. female.     History of Present Illness  Patient presents with complaints of a blood clot.  She states she noticed it about a month ago and just assumed it was a blood clot and did not do anything about it.  States that it was not giving her any issues other than a lump popped up.  But over the past 2 days she is now having redness, warmth, swelling, tenderness to the touch.  She also states that the pain will radiate out of the lump all the way up her right leg.  She denies shortness of breath, changes in vision, strokelike symptoms.  Denies chest pain.  Denies previous blood clots      The following portions of the patient's history were reviewed and updated as appropriate: allergies, current medications, past family history, past medical history, past social history, past surgical history, and problem list.    Review of Systems   All other systems reviewed and are negative.      Objective   Physical Exam  Vitals and nursing note reviewed.   Constitutional:       Appearance: Normal appearance.   HENT:      Head: Normocephalic and atraumatic.      Right Ear: External ear normal.      Left Ear: External ear normal.      Nose: Nose normal.      Mouth/Throat:      Mouth: Mucous membranes are moist.      Pharynx: Oropharynx is clear. No oropharyngeal exudate or posterior oropharyngeal erythema.   Eyes:      Extraocular Movements: Extraocular movements intact.      Conjunctiva/sclera: Conjunctivae normal.      Pupils: Pupils are equal, round, and reactive to light.   Cardiovascular:      Rate and Rhythm: Normal rate and regular rhythm.      Pulses: Normal pulses.      Heart sounds: Normal heart sounds.   Pulmonary:      Effort: Pulmonary effort is normal.      Breath sounds: Normal breath sounds.   Abdominal:      General: Abdomen is flat. Bowel sounds are normal.      Palpations: Abdomen is soft.   Musculoskeletal:         General: Normal range of motion.      Cervical  back: Normal range of motion.      Comments: Lump on dorsal surface of right foot.    Skin:     General: Skin is warm.      Capillary Refill: Capillary refill takes less than 2 seconds.   Neurological:      General: No focal deficit present.      Mental Status: She is alert and oriented to person, place, and time. Mental status is at baseline.   Psychiatric:         Mood and Affect: Mood normal.         Behavior: Behavior normal.         Thought Content: Thought content normal.         Judgment: Judgment normal.         Assessment & Plan   Diagnoses and all orders for this visit:    1. Right foot pain  -     US venous doppler lower extremity right (duplex); Future  -     XR Foot 3+ View Right; Future    2. Swelling of right foot  -     US venous doppler lower extremity right (duplex); Future  -     XR Foot 3+ View Right; Future       Stat ultrasound ordered to rule out blood clot  X-ray of foot ordered as well  Counseled patient that it feels like a cyst to me on exam and likely can is getting bigger.  If blood clot is ruled out we can refer her to orthopedic surgery  Tylenol and Motrin as needed for pain  Counseled on elevating leg

## 2024-02-26 ENCOUNTER — TELEPHONE (OUTPATIENT)
Dept: INTERNAL MEDICINE | Facility: CLINIC | Age: 39
End: 2024-02-26
Payer: COMMERCIAL

## 2024-02-26 NOTE — TELEPHONE ENCOUNTER
"Relay     \"  Hemoglobin A1c 5.8, continues to be within the range of prediabetes.  Vitamin D is insufficient.  Recommend taking an over-the-counter supplement, 3700-0909 IU a day.  Sitting with face and forearms exposed to the sun for 20 minutes a day will enhance absorption through the skin. ...\"        "

## 2025-02-20 DIAGNOSIS — E03.9 ACQUIRED HYPOTHYROIDISM: ICD-10-CM

## 2025-02-20 RX ORDER — LEVOTHYROXINE SODIUM 100 UG/1
100 TABLET ORAL DAILY
Qty: 90 TABLET | Refills: 3 | Status: SHIPPED | OUTPATIENT
Start: 2025-02-20

## 2025-02-21 ENCOUNTER — OFFICE VISIT (OUTPATIENT)
Dept: INTERNAL MEDICINE | Facility: CLINIC | Age: 40
End: 2025-02-21
Payer: COMMERCIAL

## 2025-02-21 VITALS
HEIGHT: 60 IN | SYSTOLIC BLOOD PRESSURE: 180 MMHG | DIASTOLIC BLOOD PRESSURE: 110 MMHG | HEART RATE: 113 BPM | TEMPERATURE: 98.4 F | WEIGHT: 261 LBS | BODY MASS INDEX: 51.24 KG/M2 | OXYGEN SATURATION: 98 %

## 2025-02-21 DIAGNOSIS — Z00.00 ENCOUNTER FOR ANNUAL PHYSICAL EXAMINATION EXCLUDING GYNECOLOGICAL EXAMINATION IN A PATIENT OLDER THAN 17 YEARS: Primary | ICD-10-CM

## 2025-02-21 DIAGNOSIS — Z13.220 SCREENING FOR LIPID DISORDERS: ICD-10-CM

## 2025-02-21 DIAGNOSIS — Z13.29 SCREENING FOR ENDOCRINE, METABOLIC AND IMMUNITY DISORDER: ICD-10-CM

## 2025-02-21 DIAGNOSIS — Z13.228 SCREENING FOR ENDOCRINE, METABOLIC AND IMMUNITY DISORDER: ICD-10-CM

## 2025-02-21 DIAGNOSIS — E66.01 MORBID (SEVERE) OBESITY DUE TO EXCESS CALORIES: ICD-10-CM

## 2025-02-21 DIAGNOSIS — F51.5 NIGHTMARES: ICD-10-CM

## 2025-02-21 DIAGNOSIS — E03.9 ACQUIRED HYPOTHYROIDISM: ICD-10-CM

## 2025-02-21 DIAGNOSIS — I10 PRIMARY HYPERTENSION: ICD-10-CM

## 2025-02-21 DIAGNOSIS — R00.2 PALPITATIONS: ICD-10-CM

## 2025-02-21 DIAGNOSIS — Z13.0 SCREENING FOR ENDOCRINE, METABOLIC AND IMMUNITY DISORDER: ICD-10-CM

## 2025-02-21 DIAGNOSIS — R06.83 SNORING: ICD-10-CM

## 2025-02-21 DIAGNOSIS — E55.9 VITAMIN D INSUFFICIENCY: ICD-10-CM

## 2025-02-21 DIAGNOSIS — R73.9 HYPERGLYCEMIA: ICD-10-CM

## 2025-02-21 DIAGNOSIS — Z13.0 SCREENING FOR DISORDER OF BLOOD AND BLOOD-FORMING ORGANS: ICD-10-CM

## 2025-02-21 RX ORDER — NIFEDIPINE 30 MG
30 TABLET, EXTENDED RELEASE ORAL DAILY
Qty: 90 TABLET | Refills: 1 | Status: SHIPPED | OUTPATIENT
Start: 2025-02-21

## 2025-02-21 NOTE — PROGRESS NOTES
Chief Complaint   Patient presents with    Annual Exam       Lorie Lara is a 39 y.o. female and is here for a comprehensive physical exam.     HTN.  Times when she becomes dizzy when standing still.  While watching a movie in the theater had abnormal eye movement that lasted for a few seconds.  Has been promoted at work, stress increased.  Palpitations once a day.  Occasionally associated with SOA, sweating. Gestational hypertension, treated with nifedipine which she tolerated well.  Brother had mitral valve repair.  Brother and sister both have heart murmurs. Grandfather had a fib.  Denies chest pain, dyspnea, orthopnea, lower extremity edema, confusion, headaches, weakness.    She snores. No witnessed episodes of apnea.  Has been having nightmares recently, wake her from sleep.      Hypothyroidism. Taking levothyroxine as prescribed. She has gained 10 pounds in the past year. Denies abnormal fatigue, temperature intolerance, hair/skin/nail changes, bowel habit changes, palpitations, anxiety, sore throat, hoarseness.      History:  LMP: 25-25.  Regular in pattern.     Menarche: 13 years old, 1998  Sexual activity:  yes, monogamous with her . He had vasectomy last year.     Last pap date: 2.5 years ago, will schedule with gynecology  Abnormal pap? No  : 2  Para: 2      Do you take any herbs or supplements that were not prescribed by a doctor? Yes, MVN     Health Habits:  Dental Exam. up to date.  Brushes daily.  Eye Exam. up to date, wears glasses.  Rarely wears contacts.   Exercise: 0 times/week.  Current exercise activities include: none  Healthy diet, with family     Health Maintenance   Topic Date Due    BMI FOLLOWUP  2025    INFLUENZA VACCINE  2025 (Originally 2024)    PAP SMEAR  04/15/2025 (Originally 3/9/2024)    COVID-19 Vaccine ( season) 2025 (Originally 2024)    ANNUAL PHYSICAL  2026    TDAP/TD VACCINES (5 - Td or  Tdap) 2032    HEPATITIS C SCREENING  Completed    Pneumococcal Vaccine 0-49  Aged Out       PMH, PSH, SocHx, FamHx, Allergies, and Medications: Reviewed and updated in the Visit Navigator.     No Known Allergies  Past Medical History:   Diagnosis Date    Acquired hypothyroidism     Anxiety     Chronic hypertension with superimposed pre-eclampsia 2022    GDM (gestational diabetes mellitus), class A1 2022    History of gestational diabetes 2019    G1    History of gestational hypertension 2019    G1    History of varicella 1990    Obesity     Pregnancy 2022     Past Surgical History:   Procedure Laterality Date    INGUINAL HERNIA REPAIR Right 2006    TONSILLECTOMY  2002     Social History     Socioeconomic History    Marital status:    Tobacco Use    Smoking status: Former     Current packs/day: 0.00     Average packs/day: 1 pack/day for 14.0 years (14.0 ttl pk-yrs)     Types: Cigarettes     Start date: 1999     Quit date: 2013     Years since quittin.1    Smokeless tobacco: Never   Vaping Use    Vaping status: Never Used   Substance and Sexual Activity    Alcohol use: Yes     Alcohol/week: 3.0 standard drinks of alcohol     Types: 3 Glasses of wine per week     Comment: SOCIAL    Drug use: Not Currently     Frequency: 5.0 times per week     Types: Marijuana     Comment: Use at night to relax, help sleep, also with nausea. Have X.    Sexual activity: Yes     Partners: Male     Birth control/protection: Vasectomy     Family History   Problem Relation Age of Onset    Hypertension Mother     Thyroid disease Mother     Arthritis Brother     Heart disease Brother         Mitral valve repair    Cancer Other     Melanoma Maternal Grandfather     Obesity Paternal Grandmother     Breast cancer Paternal Aunt         Just learned last year. Dont know when.       Review of Systems  Review of Systems   All other systems reviewed and are negative.      Objective   BP (!) 180/110    "Pulse 113   Temp 98.4 °F (36.9 °C)   Ht 152.4 cm (60\")   Wt 118 kg (261 lb)   SpO2 98%   BMI 50.97 kg/m²   Body mass index is 50.97 kg/m².    Physical Exam  Constitutional:       Appearance: She is well-developed. She is morbidly obese. She is not ill-appearing.   HENT:      Head: Normocephalic.      Right Ear: Tympanic membrane, ear canal and external ear normal.      Left Ear: Tympanic membrane, ear canal and external ear normal.      Nose: Nose normal.      Mouth/Throat:      Mouth: Mucous membranes are moist.      Pharynx: Oropharynx is clear. Uvula midline.   Eyes:      Extraocular Movements: Extraocular movements intact.      Conjunctiva/sclera: Conjunctivae normal.      Pupils: Pupils are equal, round, and reactive to light.   Neck:      Thyroid: No thyromegaly.   Cardiovascular:      Rate and Rhythm: Regular rhythm. Tachycardia present.      Heart sounds: Normal heart sounds.   Pulmonary:      Effort: Pulmonary effort is normal.      Breath sounds: Normal breath sounds.   Abdominal:      General: Bowel sounds are normal.      Palpations: Abdomen is soft.      Tenderness: There is no abdominal tenderness.   Musculoskeletal:         General: No tenderness or deformity. Normal range of motion.      Cervical back: Full passive range of motion without pain, normal range of motion and neck supple.      Right lower leg: No edema.      Left lower leg: No edema.   Lymphadenopathy:      Cervical: No cervical adenopathy.   Skin:     General: Skin is warm.      Capillary Refill: Capillary refill takes less than 2 seconds.   Neurological:      Mental Status: She is alert and oriented to person, place, and time.      Sensory: No sensory deficit.      Coordination: Coordination normal.      Gait: Gait normal.      Comments: CN II-XII normal   Psychiatric:         Attention and Perception: Attention normal.         Mood and Affect: Mood and affect normal.         Speech: Speech normal.         Behavior: Behavior " normal.         Thought Content: Thought content normal.         ECG 12 Lead    Date/Time: 2/21/2025 1:42 PM  Performed by: Kendra Anglin APRN    Authorized by: Kendra Anglin APRN  Comparison: not compared with previous ECG   Previous ECG: no previous ECG available  Rhythm: sinus rhythm  Rate: normal  BPM: 82    Clinical impression: normal ECG        Assessment & Plan   1. Healthy female exam.    2. Patient Counseling: Including but not Limited to the following, when appropriate:  --Nutrition: Stressed importance of moderation in sodium/caffeine intake, saturated fat and cholesterol, caloric balance, sufficient intake of fresh fruits, vegetables, fiber, calcium, iron, and 1 mg of folate supplement per day (for females capable of pregnancy).  --Exercise: Stressed the importance of regular exercise.   --Substance Abuse: Discussed cessation/primary prevention of tobacco, alcohol, or other drug use; driving or other dangerous activities under the influence; availability of treatment for abuse, as indicated based on social history.    --Sexuality: Discussed sexually transmitted diseases, partner selection, use of condoms, avoidance of unintended pregnancy  and contraceptive alternatives.   --Injury prevention: Discussed safety belts, safety helmets, smoke detector, smoking near bedding or upholstery.   --Dental health: Discussed importance of regular tooth brushing, flossing, and dental visits.  --Immunizations reviewed.  3. Discussed the patient's BMI with her.  The BMI is above average; BMI management plan is completed  4. Return in about 4 weeks (around 3/21/2025) for Next scheduled follow up.  5. Age-appropriate Screening Scheduled    Assessment & Plan     Diagnoses and all orders for this visit:    1. Encounter for annual physical examination excluding gynecological examination in a patient older than 17 years (Primary)    2. Primary hypertension  -     NIFEdipine CC (ADALAT CC) 30 MG 24 hr tablet;  Take 1 tablet by mouth Daily.  Dispense: 90 tablet; Refill: 1  -     Ambulatory Referral to Cardiology  -     Comprehensive Metabolic Panel  -     ECG 12 Lead        - Follow heart healthy diet.  Keep sodium intake < 1500 mg per day.  Avoid processed & fast foods.          - Exercise as tolerated, with a goal of 30 minutes of moderate exercise most days.         - Take medications as prescribed.    3. Palpitations  -     Ambulatory Referral to Cardiology  -     ECG 12 Lead    4. Acquired hypothyroidism  -     T4, Free  -     TSH       - Continue levothyroxine at prescribed dose    5. Snoring  -     Ambulatory Referral to Sleep Medicine    6. Nightmares  -     Ambulatory Referral to Sleep Medicine    7. Vitamin D insufficiency  -     Vitamin D,25-Hydroxy    8. Hyperglycemia  -     Hemoglobin A1c    9. Morbid (severe) obesity due to excess calories  -     Vitamin D,25-Hydroxy  - Class 3 Severe Obesity (BMI >=40). Obesity-related health conditions include the following: hypertension, impaired fasting glucose, and dyslipidemias. Obesity is worsening. BMI is is above average; BMI management plan is completed. We discussed low calorie, low carb based diet program, portion control, and increasing exercise.     10. Screening for lipid disorders  -     Lipid Panel    11. Screening for endocrine, metabolic and immunity disorder  -     Comprehensive Metabolic Panel    12. Screening for disorder of blood and blood-forming organs  -     CBC & Differential

## 2025-02-22 ENCOUNTER — LAB (OUTPATIENT)
Dept: LAB | Facility: HOSPITAL | Age: 40
End: 2025-02-22
Payer: COMMERCIAL

## 2025-02-22 LAB
25(OH)D3 SERPL-MCNC: 34.3 NG/ML (ref 30–100)
ALBUMIN SERPL-MCNC: 4.2 G/DL (ref 3.5–5.2)
ALBUMIN/GLOB SERPL: 1.6 G/DL
ALP SERPL-CCNC: 87 U/L (ref 39–117)
ALT SERPL W P-5'-P-CCNC: 19 U/L (ref 1–33)
ANION GAP SERPL CALCULATED.3IONS-SCNC: 10 MMOL/L (ref 5–15)
AST SERPL-CCNC: 18 U/L (ref 1–32)
BASOPHILS # BLD AUTO: 0.03 10*3/MM3 (ref 0–0.2)
BASOPHILS NFR BLD AUTO: 0.5 % (ref 0–1.5)
BILIRUB SERPL-MCNC: 0.4 MG/DL (ref 0–1.2)
BUN SERPL-MCNC: 13 MG/DL (ref 6–20)
BUN/CREAT SERPL: 19.1 (ref 7–25)
CALCIUM SPEC-SCNC: 9.1 MG/DL (ref 8.6–10.5)
CHLORIDE SERPL-SCNC: 103 MMOL/L (ref 98–107)
CHOLEST SERPL-MCNC: 171 MG/DL (ref 0–200)
CO2 SERPL-SCNC: 23 MMOL/L (ref 22–29)
CREAT SERPL-MCNC: 0.68 MG/DL (ref 0.57–1)
DEPRECATED RDW RBC AUTO: 41.6 FL (ref 37–54)
EGFRCR SERPLBLD CKD-EPI 2021: 114.5 ML/MIN/1.73
EOSINOPHIL # BLD AUTO: 0.16 10*3/MM3 (ref 0–0.4)
EOSINOPHIL NFR BLD AUTO: 2.7 % (ref 0.3–6.2)
ERYTHROCYTE [DISTWIDTH] IN BLOOD BY AUTOMATED COUNT: 12.6 % (ref 12.3–15.4)
GLOBULIN UR ELPH-MCNC: 2.7 GM/DL
GLUCOSE SERPL-MCNC: 101 MG/DL (ref 65–99)
HBA1C MFR BLD: 5.9 % (ref 4.8–5.6)
HCT VFR BLD AUTO: 43 % (ref 34–46.6)
HDLC SERPL-MCNC: 41 MG/DL (ref 40–60)
HGB BLD-MCNC: 13.8 G/DL (ref 12–15.9)
IMM GRANULOCYTES # BLD AUTO: 0.01 10*3/MM3 (ref 0–0.05)
IMM GRANULOCYTES NFR BLD AUTO: 0.2 % (ref 0–0.5)
LDLC SERPL CALC-MCNC: 111 MG/DL (ref 0–100)
LDLC/HDLC SERPL: 2.66 {RATIO}
LYMPHOCYTES # BLD AUTO: 1.87 10*3/MM3 (ref 0.7–3.1)
LYMPHOCYTES NFR BLD AUTO: 31.6 % (ref 19.6–45.3)
MCH RBC QN AUTO: 28.7 PG (ref 26.6–33)
MCHC RBC AUTO-ENTMCNC: 32.1 G/DL (ref 31.5–35.7)
MCV RBC AUTO: 89.4 FL (ref 79–97)
MONOCYTES # BLD AUTO: 0.5 10*3/MM3 (ref 0.1–0.9)
MONOCYTES NFR BLD AUTO: 8.4 % (ref 5–12)
NEUTROPHILS NFR BLD AUTO: 3.35 10*3/MM3 (ref 1.7–7)
NEUTROPHILS NFR BLD AUTO: 56.6 % (ref 42.7–76)
NRBC BLD AUTO-RTO: 0 /100 WBC (ref 0–0.2)
PLATELET # BLD AUTO: 223 10*3/MM3 (ref 140–450)
PMV BLD AUTO: 9.9 FL (ref 6–12)
POTASSIUM SERPL-SCNC: 3.9 MMOL/L (ref 3.5–5.2)
PROT SERPL-MCNC: 6.9 G/DL (ref 6–8.5)
RBC # BLD AUTO: 4.81 10*6/MM3 (ref 3.77–5.28)
SODIUM SERPL-SCNC: 136 MMOL/L (ref 136–145)
T4 FREE SERPL-MCNC: 1.2 NG/DL (ref 0.92–1.68)
TRIGL SERPL-MCNC: 104 MG/DL (ref 0–150)
TSH SERPL DL<=0.05 MIU/L-ACNC: 2.07 UIU/ML (ref 0.27–4.2)
VLDLC SERPL-MCNC: 19 MG/DL (ref 5–40)
WBC NRBC COR # BLD AUTO: 5.92 10*3/MM3 (ref 3.4–10.8)

## 2025-02-22 PROCEDURE — 84439 ASSAY OF FREE THYROXINE: CPT | Performed by: NURSE PRACTITIONER

## 2025-02-22 PROCEDURE — 80050 GENERAL HEALTH PANEL: CPT | Performed by: NURSE PRACTITIONER

## 2025-02-22 PROCEDURE — 82306 VITAMIN D 25 HYDROXY: CPT | Performed by: NURSE PRACTITIONER

## 2025-02-22 PROCEDURE — 80061 LIPID PANEL: CPT | Performed by: NURSE PRACTITIONER

## 2025-02-22 PROCEDURE — 83036 HEMOGLOBIN GLYCOSYLATED A1C: CPT | Performed by: NURSE PRACTITIONER

## 2025-02-22 PROCEDURE — 36415 COLL VENOUS BLD VENIPUNCTURE: CPT | Performed by: NURSE PRACTITIONER

## 2025-02-24 NOTE — PROGRESS NOTES
"LDL cholesterol very slightly elevated, remainder of lipid panel is normal.  Hemoglobin A1c continues to be in \"prediabetes\" range.  Consume low carb, heart healthy diet like the Mediterranean diet and be physically active most days.  Avoid sweets, soda, high fructose corn syrup, processed/refined foods and alcohol.    Otherwise labs are normal. "

## 2025-03-22 ENCOUNTER — PATIENT MESSAGE (OUTPATIENT)
Dept: INTERNAL MEDICINE | Facility: CLINIC | Age: 40
End: 2025-03-22

## 2025-03-22 ENCOUNTER — OFFICE VISIT (OUTPATIENT)
Dept: INTERNAL MEDICINE | Facility: CLINIC | Age: 40
End: 2025-03-22
Payer: COMMERCIAL

## 2025-03-22 VITALS
BODY MASS INDEX: 51.04 KG/M2 | HEIGHT: 60 IN | TEMPERATURE: 98.1 F | RESPIRATION RATE: 18 BRPM | HEART RATE: 98 BPM | WEIGHT: 260 LBS | SYSTOLIC BLOOD PRESSURE: 128 MMHG | DIASTOLIC BLOOD PRESSURE: 90 MMHG | OXYGEN SATURATION: 99 %

## 2025-03-22 DIAGNOSIS — I10 PRIMARY HYPERTENSION: Primary | ICD-10-CM

## 2025-03-22 DIAGNOSIS — E78.00 PURE HYPERCHOLESTEROLEMIA: ICD-10-CM

## 2025-03-22 PROCEDURE — 99214 OFFICE O/P EST MOD 30 MIN: CPT | Performed by: NURSE PRACTITIONER

## 2025-03-22 RX ORDER — AMOXICILLIN 875 MG/1
875 TABLET, COATED ORAL 2 TIMES DAILY
COMMUNITY
Start: 2025-03-17 | End: 2025-03-27

## 2025-03-22 RX ORDER — FLUTICASONE PROPIONATE 50 MCG
SPRAY, SUSPENSION (ML) NASAL
COMMUNITY
Start: 2025-03-17

## 2025-03-22 NOTE — PROGRESS NOTES
Office Visit      Patient Name: Lorie Lara  : 1985   MRN: [id number]     Chief Complaint:    Chief Complaint   Patient presents with    Hyperlipidemia     4 week f/u    Hypertension     4 week f/u       History of Present Illness  T Lorie Lara is a 39 y.o. female who presents for follow up of HTN, HLD.      She has been monitoring her blood pressure at home since 2025, utilizing a cuff placed on her desk where she spends the majority of her day. She admits to forgetting her medication initially but resumed it upon experiencing a headache. Her blood pressure readings have shown improvement, although diastolic continues to be > 100 most days. Her goal is to maintain her blood pressure below 100, but she acknowledges difficulty in achieving this. She reports no side effects from nifedipine. She has made dietary changes, including replacing coffee with black tea, consuming one cup in the morning, and avoiding soda. Her primary beverage is water, with occasional unsweetened tea. She inquires about the possibility of consuming Fresca while on nifedipine. She has not been engaging in regular exercise, although she has increased her overall physical activity. She expresses interest in participating in an upcoming walking challenge at her workplace, which is scheduled to start on 2025. She has a cardiology appointment scheduled for Monday. Denies chest pain, dyspnea, orthopnea, palpitations, lower extremity edema, confusion, headaches, weakness, visual disturbances.    Sleep medicine consult 25.            Subjective      I have reviewed and the following portions of the patient's history were updated as appropriate: past family history, past medical history, past social history, past surgical history and problem list.      Current Outpatient Medications:     amoxicillin (AMOXIL) 875 MG tablet, Take 1 tablet by mouth 2 (Two) Times a Day., Disp: , Rfl:     fluticasone  "(FLONASE) 50 MCG/ACT nasal spray, , Disp: , Rfl:     levothyroxine (SYNTHROID, LEVOTHROID) 100 MCG tablet, TAKE 1 TABLET BY MOUTH DAILY, Disp: 90 tablet, Rfl: 3    multivitamin (MULTIVITAMIN PO), Take 1 tablet by mouth Daily., Disp: , Rfl:     NIFEdipine CC (ADALAT CC) 60 MG 24 hr tablet, Take 1 tablet by mouth Daily., Disp: 90 tablet, Rfl: 1    No Known Allergies    Objective     Physical Exam:  Vital Signs:   Vitals:    03/22/25 0909   BP: 128/90   Pulse: 98   Resp: 18   Temp: 98.1 °F (36.7 °C)   SpO2: 99%   Weight: 118 kg (260 lb)   Height: 152.4 cm (60\")   PainSc: 0-No pain     Body mass index is 50.78 kg/m².         Physical Exam  Constitutional:       Appearance: She is morbidly obese. She is not ill-appearing.   HENT:      Head: Normocephalic.      Right Ear: External ear normal.      Left Ear: External ear normal.   Eyes:      Conjunctiva/sclera: Conjunctivae normal.      Pupils: Pupils are equal, round, and reactive to light.   Cardiovascular:      Rate and Rhythm: Normal rate and regular rhythm.      Heart sounds: Normal heart sounds.   Pulmonary:      Effort: Pulmonary effort is normal.      Breath sounds: Normal breath sounds.   Musculoskeletal:      Cervical back: Normal range of motion and neck supple.   Skin:     General: Skin is warm.      Capillary Refill: Capillary refill takes less than 2 seconds.   Neurological:      Mental Status: She is alert and oriented to person, place, and time.      Coordination: Coordination normal.      Gait: Gait normal.   Psychiatric:         Mood and Affect: Mood normal.         Behavior: Behavior normal.         Thought Content: Thought content normal.       Assessment / Plan      Assessment/Plan:   Diagnoses and all orders for this visit:    1. Primary hypertension (Primary)  -     NIFEdipine CC (ADALAT CC) 60 MG 24 hr tablet; Take 1 tablet by mouth Daily.  Dispense: 90 tablet; Refill: 1.  Increasing dose from 30 mg to 60 mg. Abstain from Fresca and other products " with grapefruit while taking this medication.    -     Comprehensive Metabolic Panel        - Follow heart healthy diet.  Keep sodium intake < 1500 mg per day.  Avoid processed & fast foods.          - Exercise as tolerated, with a goal of 30 minutes of moderate exercise most days.         - Take medications as prescribed.    2. Pure hypercholesterolemia  -     Lipid Panel             Follow Up:   Return in about 6 months (around 9/22/2025) for Next scheduled follow up.    Patient was given instructions and counseling regarding her condition or for health maintenance advice. Please see specific information pulled into the AVS if appropriate.       [facility] [address] [facility]     Please note that portions of this note may have been completed with a voice recognition program. Efforts were made to edit dictation, but occasionally words are mistranscribed.

## 2025-03-24 ENCOUNTER — OFFICE VISIT (OUTPATIENT)
Dept: CARDIOLOGY | Facility: CLINIC | Age: 40
End: 2025-03-24
Payer: COMMERCIAL

## 2025-03-24 VITALS
DIASTOLIC BLOOD PRESSURE: 100 MMHG | WEIGHT: 258 LBS | BODY MASS INDEX: 50.65 KG/M2 | HEART RATE: 105 BPM | SYSTOLIC BLOOD PRESSURE: 150 MMHG | OXYGEN SATURATION: 95 % | HEIGHT: 60 IN

## 2025-03-24 DIAGNOSIS — G47.33 OSA (OBSTRUCTIVE SLEEP APNEA): ICD-10-CM

## 2025-03-24 DIAGNOSIS — I10 PRIMARY HYPERTENSION: Primary | ICD-10-CM

## 2025-03-24 DIAGNOSIS — R00.2 PALPITATIONS: ICD-10-CM

## 2025-03-24 PROCEDURE — 99214 OFFICE O/P EST MOD 30 MIN: CPT | Performed by: INTERNAL MEDICINE

## 2025-03-24 RX ORDER — IBUPROFEN 800 MG/1
800 TABLET, FILM COATED ORAL EVERY 8 HOURS SCHEDULED
COMMUNITY
Start: 2025-03-17 | End: 2025-04-06

## 2025-03-24 NOTE — PROGRESS NOTES
[facility] Cardiology  Consultation H&P  Lorie Lara  1985  [address]   [place] [facility] [address]     VISIT DATE:  25    PCP: Kendra Anglin APRN  [address]  [place] [address]    IDENTIFICATION: A 39 y.o. female  [employer], work from home    PROBLEM LIST:   Palpitations  HTN  HLD    [phone]  Prediabetes   A1c 5.9  Hypothyroidism  Gestational HTN/preeclampsia   Suspected KYRA-pending eval  Surgical history:  Right inguinal hernia repair  Tonsillectomy       CC:  Chief Complaint   Patient presents with    Palpitations    Hypertension    Establish Care       Allergies  No Known Allergies    Current Medications  Current Outpatient Medications   Medication Instructions    amoxicillin (AMOXIL) 875 mg, 2 Times Daily    Elderberry-Vitamin C-Zinc (ELDERBERRY EXTRACT PO) Take  by mouth.    fluticasone (FLONASE) 50 MCG/ACT nasal spray     ibuprofen (ADVIL,MOTRIN) 800 mg, Every 8 Hours Scheduled    levothyroxine (SYNTHROID, LEVOTHROID) 100 mcg, Oral, Daily    multivitamin (MULTIVITAMIN PO) 1 tablet, Daily    multivitamin with minerals (HAIR SKIN & NAILS ADVANCED PO) 1 tablet, Daily    NIFEdipine CC (ADALAT CC) 60 mg, Oral, Daily    vitamin D3 5,000 Units, Daily        History of Present Illness   HPI  Lorie Lara is a 39 y.o. year old female with the above mentioned PMH who presents for consult from Kendra Anglin APRN for evaluation of hypertension.  Patient with longstanding hypertension worsened with pregnancy.  She is recently escalated her nifedipine to 60 daily and noted she had some dizzy spells with systolic pressure of 120.  She states prior to increasing to 60 her systolic pressure typically was 140.  She also notes occasional left arm tingling that may be positionally related.  She does ADLs including caring for 2 toddlers gardens when weather permits no regular aerobic activity.  Pt denies any chest pain,  "dyspnea at rest, dyspnea on exertion, orthopnea, PND,  lower extremity edema, or claudication. Pt denies history of CHF, DVT, PE, MI, CVA, TIA, or rheumatic fever.       ROS  Review of Systems   Cardiovascular:  Positive for palpitations.   Respiratory:  Positive for shortness of breath and snoring.    All other systems reviewed and are negative.      SOCIAL HX  Social History     Socioeconomic History    Marital status:    Tobacco Use    Smoking status: Former     Current packs/day: 0.00     Average packs/day: 1 pack/day for 14.0 years (14.0 ttl pk-yrs)     Types: Cigarettes     Start date: 1999     Quit date: 2013     Years since quittin.2     Passive exposure: Past    Smokeless tobacco: Never   Vaping Use    Vaping status: Never Used   Substance and Sexual Activity    Alcohol use: Yes     Alcohol/week: 3.0 standard drinks of alcohol     Types: 3 Glasses of wine per week     Comment: SOCIAL    Drug use: Not Currently     Frequency: 5.0 times per week     Types: Marijuana     Comment: Use at night to relax, help sleep, also with nausea. Have X.    Sexual activity: Yes     Partners: Male     Birth control/protection: Vasectomy       FAMILY HX  Family History   Problem Relation Age of Onset    Hypertension Mother     Thyroid disease Mother     Arthritis Brother     Heart disease Brother         Mitral valve repair    Breast cancer Paternal Aunt         Just learned last year. Dont know when.    Melanoma Maternal Grandfather     Obesity Paternal Grandmother     Cancer Other        OBJECTIVE:  Vitals:    25 0954   BP: 150/100   BP Location: Right arm   Patient Position: Sitting   Cuff Size: Adult   Pulse: 105   SpO2: 95%   Weight: 117 kg (258 lb)   Height: 152.4 cm (60\")     Body mass index is 50.39 kg/m².     PHYSICAL EXAMINATION:  Constitutional:       Appearance: Healthy appearance. Not in distress.   Neck:      Vascular: No JVR. JVD normal.   Pulmonary:      Effort: Pulmonary effort is " normal.      Breath sounds: Normal breath sounds. No wheezing. No rhonchi. No rales.   Chest:      Chest wall: Not tender to palpatation.   Cardiovascular:      PMI at left midclavicular line. Normal rate. Regular rhythm. Normal S1. Normal S2.       Murmurs: There is a systolic murmur.      No gallop.  No click. No rub.   Pulses:     Intact distal pulses.   Edema:     Peripheral edema absent.   Abdominal:      General: Bowel sounds are normal.      Palpations: Abdomen is soft.      Tenderness: There is no abdominal tenderness.   Musculoskeletal: Normal range of motion.         General: No tenderness. Skin:     General: Skin is warm and dry.   Neurological:      General: No focal deficit present.      Mental Status: Alert and oriented to person, place and time.         Diagnostic Data:  Procedures  EKG from 2/21/2025 reviewed  LABS:    Lab Results   Component Value Date    CHOL 171 02/22/2025    CHLPL 156 02/23/2024    TRIG 104 02/22/2025    HDL 41 02/22/2025     (H) 02/22/2025      Lab Results   Component Value Date    GLUCOSE 101 (H) 02/22/2025    CALCIUM 9.1 02/22/2025     02/22/2025    K 3.9 02/22/2025    CO2 23.0 02/22/2025     02/22/2025    BUN 13 02/22/2025    CREATININE 0.68 02/22/2025    EGFR 114.5 02/22/2025    BCR 19.1 02/22/2025    ANIONGAP 10.0 02/22/2025      Lab Results   Component Value Date    WBC 5.92 02/22/2025    HGB 13.8 02/22/2025    HCT 43.0 02/22/2025    MCV 89.4 02/22/2025     02/22/2025     Lab Results   Component Value Date    HGBA1C 5.90 (H) 02/22/2025      Lab Results   Component Value Date    TSH 2.070 02/22/2025          Advance Care Planning   ACP discussion was held with the patient during this visit. Patient has an advance directive (not in EMR), copy requested.         ASSESSMENT:     Diagnosis Plan   1. Primary hypertension        2. Palpitations        3. KYRA (obstructive sleep apnea)            PLAN:    Hypertension, palpitations, and probable sleep  apnea    She will stagger 30 with 60 mg nifedipine for the next 1 week and then increase to 60 daily.    Will document WatchPAT    Will document echocardiogram          Kendra Anglin APRN, thank you for referring Ms. Lara for evaluation.  I have forwarded my electronically generated recommendations to you for review.  Please do not hesitate to call with any questions.      Roosevelt Vang MD, FACC

## 2025-04-28 ENCOUNTER — OFFICE VISIT (OUTPATIENT)
Dept: SLEEP MEDICINE | Age: 40
End: 2025-04-28
Payer: COMMERCIAL

## 2025-04-28 ENCOUNTER — PATIENT ROUNDING (BHMG ONLY) (OUTPATIENT)
Dept: SLEEP MEDICINE | Age: 40
End: 2025-04-28
Payer: COMMERCIAL

## 2025-04-28 ENCOUNTER — PATIENT MESSAGE (OUTPATIENT)
Dept: INTERNAL MEDICINE | Facility: CLINIC | Age: 40
End: 2025-04-28
Payer: COMMERCIAL

## 2025-04-28 VITALS
TEMPERATURE: 98.5 F | SYSTOLIC BLOOD PRESSURE: 140 MMHG | DIASTOLIC BLOOD PRESSURE: 88 MMHG | WEIGHT: 259 LBS | BODY MASS INDEX: 50.85 KG/M2 | HEIGHT: 60 IN | OXYGEN SATURATION: 98 % | HEART RATE: 97 BPM

## 2025-04-28 DIAGNOSIS — E66.01 OBESITY, MORBID, BMI 50 OR HIGHER: ICD-10-CM

## 2025-04-28 DIAGNOSIS — G47.19 EXCESSIVE DAYTIME SLEEPINESS: ICD-10-CM

## 2025-04-28 DIAGNOSIS — G47.33 OSA (OBSTRUCTIVE SLEEP APNEA): Primary | ICD-10-CM

## 2025-04-28 PROBLEM — Z34.90 PREGNANCY: Status: RESOLVED | Noted: 2022-07-01 | Resolved: 2025-04-28

## 2025-04-28 PROBLEM — O11.9 CHRONIC HYPERTENSION WITH SUPERIMPOSED PRE-ECLAMPSIA: Status: RESOLVED | Noted: 2022-07-12 | Resolved: 2025-04-28

## 2025-04-28 PROBLEM — O09.523 MULTIGRAVIDA OF ADVANCED MATERNAL AGE IN THIRD TRIMESTER: Status: RESOLVED | Noted: 2022-01-10 | Resolved: 2025-04-28

## 2025-04-28 PROBLEM — J30.9 ALLERGIC RHINITIS: Status: ACTIVE | Noted: 2025-04-28

## 2025-04-28 PROBLEM — O24.410 GDM (GESTATIONAL DIABETES MELLITUS), CLASS A1: Status: RESOLVED | Noted: 2022-06-13 | Resolved: 2025-04-28

## 2025-04-28 PROBLEM — O28.8 AFI (AMNIOTIC FLUID INDEX) BORDERLINE LOW: Status: RESOLVED | Noted: 2022-07-25 | Resolved: 2025-04-28

## 2025-04-28 PROCEDURE — 99204 OFFICE O/P NEW MOD 45 MIN: CPT | Performed by: INTERNAL MEDICINE

## 2025-04-28 NOTE — PROGRESS NOTES
Lorie Lara is a 39 y.o. female.   Chief Complaint   Patient presents with    Snoring    Dry Mouth    Nightmares       HPI     39 y.o. female seen in consultation at the request of Kendra Anglin * for evaluation of the above.     She describes palpitations/heart racing primarily at night.  She awakens frequently and has vivid dreams/nightmares.    She does feel intermittently somnolent during the day.  Paradoxically, she has more energy when she sleeps last.  Typically she will sleep from around 10 or 11 at night until 4 or 5 in the morning and she thinks she averages around 6 hours of sleep per night and if she gets more she will often feel worse.  She falls asleep very quickly at night and thinks she awakens an average of 3 times to her best recollection at night.    She does awaken with a dry mouth.  She has no morning headaches.    Her bed partner has observed snoring and occasional apneas.  Her only parasomnia seems to be talking at night.    From her standpoint she notes no nocturnal hallucinations or significant sleep paralysis.    Her medical history is significant for hypertension and hypothyroidism primarily.  She has never had atrial fibrillation that she is aware of.    She has seen Dr. Vang in cardiology.  A home sleep apnea test was performed from a third-party and she was called for a practice in [place] to schedule follow-up but was not given the results    [place] Scale is: 4/24    The patient's relevant past medical, surgical, family, and social history reviewed and updated in Epic as appropriate.    Current medications are:   Current Outpatient Medications:     Elderberry-Vitamin C-Zinc (ELDERBERRY EXTRACT PO), Take  by mouth., Disp: , Rfl:     fluticasone (FLONASE) 50 MCG/ACT nasal spray, , Disp: , Rfl:     levothyroxine (SYNTHROID, LEVOTHROID) 100 MCG tablet, TAKE 1 TABLET BY MOUTH DAILY, Disp: 90 tablet, Rfl: 3    multivitamin (MULTIVITAMIN PO), Take 1 tablet by mouth  "Daily., Disp: , Rfl:     multivitamin with minerals (HAIR SKIN & NAILS ADVANCED PO), Take 1 tablet by mouth Daily., Disp: , Rfl:     NIFEdipine CC (ADALAT CC) 60 MG 24 hr tablet, Take 1 tablet by mouth Daily., Disp: 90 tablet, Rfl: 1    vitamin D3 125 MCG (5000 UT) capsule capsule, Take 1 capsule by mouth Daily., Disp: , Rfl: .    Review of Systems    Review of Systems  ROS documented in patient questionnaire ×14 systems.  Otherwise negative except as noted in HPI.    Physical Exam    Blood pressure 140/88, pulse 97, temperature 98.5 °F (36.9 °C), temperature source Temporal, height 152.4 cm (60\"), weight 117 kg (259 lb), SpO2 98%, not currently breastfeeding. Body mass index is 50.58 kg/m².    Physical Exam  Vitals and nursing note reviewed.   Constitutional:       Appearance: Normal appearance. She is well-developed.   HENT:      Head: Normocephalic and atraumatic.      Nose: Nose normal.      Mouth/Throat:      Mouth: Mucous membranes are moist.      Pharynx: Oropharynx is clear. No oropharyngeal exudate.      Comments: Class III airway  Eyes:      General: No scleral icterus.     Conjunctiva/sclera: Conjunctivae normal.   Neck:      Thyroid: No thyromegaly.      Trachea: No tracheal deviation.   Cardiovascular:      Rate and Rhythm: Normal rate and regular rhythm.      Heart sounds: No murmur heard.     No friction rub. No gallop.   Pulmonary:      Effort: Pulmonary effort is normal. No respiratory distress.      Breath sounds: No wheezing or rales.   Musculoskeletal:         General: No deformity. Normal range of motion.   Skin:     General: Skin is warm and dry.      Findings: No rash.   Neurological:      Mental Status: She is alert and oriented to person, place, and time.   Psychiatric:         Behavior: Behavior normal.         Thought Content: Thought content normal.         DATA:    Reviewed 3/20/2025 note from Dr. Vang    Reviewed 3/22/2025 note from T[facility] Archuleta    Reviewed bed partner " questionnaire    ASSESSMENT:    Problem List Items Addressed This Visit          Pulmonary Problems    KYRA (obstructive sleep apnea) - Primary       Other    Excessive daytime sleepiness    Obesity, morbid, BMI 50 or higher       39-year-old female referred to the sleep clinic with symptoms of nocturnal palpitations, frequent awakenings, and nonrestorative sleep/daytime somnolence.  She has a class III airway/decreased postpharyngeal space and elevated BMI putting her at risk for the presence of KYRA.  She has actually undergone a home sleep apnea test as best I understand it but we do not have those results.  This was ordered through a third-party and she received a call to follow-up virtually with her practice in [place] for the results and she was not given the results and they are not documented in her chart.  We are attempting to get those .    Given her signs and symptoms that would be consistent with sleep apnea, if she has a an HSAT which confirmed that diagnosis then we will proceed with treatment.  We discussed treatment options today in preparation for that and all questions were answered.  If she needs further diagnostic studies they can be performed here.    PLAN:    Will attempt to obtain her HSAT  Based upon the above I will potentially order treatment.  We discussed treatment options and she was agreeable to a trial of CPAP therapy if appropriate  We discussed the nature of her likely diagnosis and treatment options in some detail.  I also discussed the importance of treatment in order to avoid long-term complications from a metabolic and cardiovascular standpoint  I recommended long-term, healthy weight loss  We discussed the potential indications of a GLP-1 agonist drug in the treatment of sleep apnea specifically and also an assistance with weight loss  Further recommendations/orders after I receive her studies  Follow-up in the sleep center    I have reviewed the results of my evaluation and  impression and discussed my recommendations in detail with the patient.    Signed by  Khai Rust MD    April 28, 2025    Addendum/29/25:    I received the home sleep apnea test report.  This was a Watch[employer] study performed on 4/4/2025.  This revealed a moderate degree of obstructive sleep apnea with an AHI of 29.0 overall.  The events were primarily obstructive.  Oxygen saturations were less than 89% for a total of 5.9 minutes throughout the night.  There was a positional effect though the AHI was elevated in multiple positions.  Snoring was present.    I will order auto CPAP at a range of 8-18 cm H2O with mask to fit to her preference as we discussed and then follow-up in 30-90 days.    Khai Rust MD    CC: Kendra Anglin, Kendra Fuchs, *

## 2025-04-28 NOTE — PROGRESS NOTES
April 28, 2025    Hello, may I speak with Lorie Lara?    My name is Deborah      I am  with [facility]  [address] [address] [phone].    Before we get started may I verify your date of birth? 1985    I am calling to officially welcome you to our practice and ask about your recent visit. Is this a good time to talk? yes    Tell me about your visit with us. What things went well?  Very good visit       We're always looking for ways to make our patients' experiences even better. Do you have recommendations on ways we may improve?  no    Overall were you satisfied with your first visit to our practice? yes       I appreciate you taking the time to speak with me today. Is there anything else I can do for you? no      Thank you, and have a great day.

## 2025-05-01 ENCOUNTER — TELEPHONE (OUTPATIENT)
Dept: SLEEP MEDICINE | Age: 40
End: 2025-05-01
Payer: COMMERCIAL

## 2025-05-01 NOTE — TELEPHONE ENCOUNTER
I talked to Lorie to see which DME she wanted to use, and she stated she has now decided not to move forward with PAP therapy.